# Patient Record
Sex: MALE | Race: WHITE | NOT HISPANIC OR LATINO | Employment: OTHER | URBAN - METROPOLITAN AREA
[De-identification: names, ages, dates, MRNs, and addresses within clinical notes are randomized per-mention and may not be internally consistent; named-entity substitution may affect disease eponyms.]

---

## 2018-01-04 ENCOUNTER — HOSPITAL ENCOUNTER (EMERGENCY)
Facility: HOSPITAL | Age: 53
Discharge: HOME | End: 2018-01-04
Attending: EMERGENCY MEDICINE | Admitting: EMERGENCY MEDICINE
Payer: MEDICARE

## 2018-01-04 VITALS
OXYGEN SATURATION: 98 % | DIASTOLIC BLOOD PRESSURE: 78 MMHG | TEMPERATURE: 97.5 F | RESPIRATION RATE: 20 BRPM | SYSTOLIC BLOOD PRESSURE: 127 MMHG | HEART RATE: 104 BPM

## 2018-01-04 DIAGNOSIS — F41.9 ANXIETY: Primary | ICD-10-CM

## 2018-01-04 PROCEDURE — 99283 EMERGENCY DEPT VISIT LOW MDM: CPT

## 2018-01-04 RX ORDER — DIVALPROEX SODIUM 500 MG/1
500 TABLET, EXTENDED RELEASE ORAL DAILY
COMMUNITY
End: 2021-04-01 | Stop reason: ALTCHOICE

## 2018-01-04 RX ORDER — DIVALPROEX SODIUM 500 MG/1
1000 TABLET, DELAYED RELEASE ORAL ONCE
Status: COMPLETED | OUTPATIENT
Start: 2018-01-04 | End: 2018-01-04

## 2018-01-04 RX ORDER — ALPRAZOLAM 0.5 MG/1
0.5 TABLET ORAL ONCE
Status: COMPLETED | OUTPATIENT
Start: 2018-01-04 | End: 2018-01-04

## 2018-01-04 RX ADMIN — ALPRAZOLAM 0.5 MG: 0.5 TABLET ORAL at 02:18

## 2018-01-04 RX ADMIN — DIVALPROEX SODIUM 1000 MG: 500 TABLET, DELAYED RELEASE ORAL at 02:18

## 2018-01-04 NOTE — ED ATTENDING ATTESTATION
Malaika Schroeder MD, saw and evaluated the patient  All available labs and X-rays were ordered by me or the resident and have been reviewed by myself  I discussed the patient with the resident / non-physician and agree with the resident's / non-physician practitioner's findings and plan as documented in the resident's / non-physician practicitioner's note, except where noted  At this point, I agree with the current assessment done in the ED  Chief Complaint   Patient presents with   [employer] Medical Problem     pt with missing dose of depakote after being taken into police custody for violating restraining order  This is a 50year old male in police custody who violated his restraining order and so is in police custody  He has his depakote at home but can't get it as he is in custody so is brought in for a depakote dosing  Has no acute complaints  Denies f/ch/n/v/cp/sob  Feels safe at home  Has no complaints  +smoking  No alcohol/drugs  Doesn't feel like he is going to have a seizure  Tetanus is up to date  PE:  Vitals:    01/04/18 0150   BP: 127/78   Pulse: 104   Resp: 20   Temp: 97 5 °F (36 4 °C)   TempSrc: Oral   SpO2: 98%   General: VS reviewed  Appears in NAD  awake, alert  Well-nourished, well-developed  Appears stated age  Speaking normally in full sentences  Head: Normocephalic, atraumatic, nontender  Eyes: EOM-I  No diplopia  No hyphema  No subconjunctival hemorrhages  Symmetrical lids  ENT: Atraumatic external nose and ears  MMM  No malocclusion  No stridor  Normal phonation  No drooling  Normal swallowing  Neck: No JVD  CV: No pallor noted  Peripheral pulses +2 throughout  No chest wall tenderness  Lungs:   No tachypnea  No respiratory distress  MSK:   FROM   Normal gait  Skin: Dry, intact  Neuro: Awake, alert, GCS15, CN II-XII grossly intact  Motor grossly intact    Psychiatric/Behavioral: Appropriate mood and affect   Exam: deferred  A:  - Medication dosing  P:  - Give depakote as he missed his home dose (500 in morning, 1000 at night of depakote)  - Return to police custody  He has no acute complaints so I don't think that an acute workup is needed  - 13 point ROS was performed and all are normal unless stated in the history above  - Nursing note reviewed  Vitals reviewed  - Orders placed by myself and/or advanced practitioner / resident     - Previous chart was not reviewed  - No language barrier    - History obtained from patient, police/EMS  - There are no limitations to the history obtained  - Critical care time: Not applicable for this patient  Final Diagnosis:  1  Anxiety      ED Course      Medications   divalproex sodium (DEPAKOTE) EC tablet 1,000 mg (1,000 mg Oral Given 1/4/18 0218)   ALPRAZolam Ardelle Fritter) tablet 0 5 mg (0 5 mg Oral Given 1/4/18 0218)     No orders to display     No orders of the defined types were placed in this encounter  Labs Reviewed - No data to display  Time reflects when diagnosis was documented in both MDM as applicable and the Disposition within this note     Time User Action Codes Description Comment    1/4/2018  1:54 AM Corinne Orts Add [F41 9] Anxiety       ED Disposition     ED Disposition Condition Comment    Discharge  Yenny Laundry discharge to home/self care  Condition at discharge: Good        Follow-up Information     Follow up With Specialties Details Why Contact Info Additional [address] Emergency Department Emergency Medicine  As needed [address] [facility], [address] [place] [address], [id number]        Discharge Medication List as of 1/4/2018  2:26 AM      CONTINUE these medications which have NOT CHANGED    Details   divalproex sodium (DEPAKOTE ER) 500 mg 24 hr tablet Take 500 mg by mouth daily Take 500 mg in AM and 1000 mg before bed , Historical Med           No discharge procedures on file    Prior to Admission Medications   Prescriptions Last Dose Informant Patient Reported? Taking?   divalproex sodium (DEPAKOTE ER) 500 mg 24 hr tablet 1/4/2018 at Unknown time  Yes Yes   Sig: Take 500 mg by mouth daily Take 500 mg in AM and 1000 mg before bed  Facility-Administered Medications: None       Portions of the record may have been created with voice recognition software  Occasional wrong word or "sound a like" substitutions may have occurred due to the inherent limitations of voice recognition software  Read the chart carefully and recognize, using context, where substitutions have occurred      Electronically signed by:  Shakila Mahoney

## 2018-01-04 NOTE — DISCHARGE INSTRUCTIONS
Epilepsy   WHAT YOU NEED TO KNOW:   Epilepsy is a brain disorder that causes seizures  It is also called a seizure disorder  A seizure means an abnormal area in your brain sometimes sends bursts of electrical activity  A seizure may start in one part of your brain, or both sides may be affected  Depending on the type of seizure, you may have movements you cannot control, lose consciousness, or stare straight ahead  You may be confused or tired after the seizure  A seizure may last a few seconds or longer than 5 minutes  A birth defect, tumor, stroke, dementia, injury, or infection may cause epilepsy  The cause of your epilepsy may not be known  If your seizures are not controlled, epilepsy may become life-threatening  DISCHARGE INSTRUCTIONS:   Call 911 for any of the following:   · Your seizure lasts longer than 5 minutes  · You have trouble breathing after a seizure  · You have diabetes or are pregnant and have a seizure  · You have a seizure in water, such as a swimming pool or bathtub  Return to the emergency department if:   · You have a second seizure within 24 hours of the first      · You are injured during a seizure  Contact your healthcare provider if:   · You feel you are not able to cope with your condition  · Your seizures start to happen more often  · You are confused longer than usual after a seizure  · You are planning to get pregnant or are currently pregnant  · You have questions or concerns about your condition or care  Medicines:   · Antiseizure medicine  may control or prevent another seizure  Do not stop taking this medicine  Another person may need to give you rescue medicine to stop a seizure at home  Ask your healthcare provider for more information about rescue medicine  · Take your medicine as directed  Contact your healthcare provider if you think your medicine is not helping or if you have side effects  Tell him of her if you are allergic to any medicine  Keep a list of the medicines, vitamins, and herbs you take  Include the amounts, and when and why you take them  Bring the list or the pill bottles to follow-up visits  Carry your medicine list with you in case of an emergency  Follow up with your neurologist as directed: You may need tests to check the level of antiseizure medicine in your blood  Your neurologist may need to change or adjust your medicine  Write down your questions so you remember to ask them during your visits  What you can do to prevent a seizure: You may not be able to prevent every seizure  The following can help you manage triggers that may make a seizure start:  · Take your medicine every day at the same time  This will also help prevent medicine side effects  Set an alarm to help remind you to take your medicine every day  · Manage stress  Stress can be a trigger for epilepsy  Exercise can help you reduce stress  Talk to your healthcare provider about exercise that is safe for you  Illness can be a form of stress  Eat a variety of healthy foods and drink plenty of liquids during an illness  Talk to your healthcare provider about other ways to manage stress  · Set a regular sleep schedule  A lack of sleep can trigger a seizure  Try to go to sleep and wake up at the same time every day  Keep your bedroom quiet and dark  Talk to your healthcare provider if you are having trouble sleeping  · Limit or do not drink alcohol as directed  Alcohol can trigger a seizure, especially if you drink a large amount at one time  A drink of alcohol is 12 ounces of beer, 1½ ounces of liquor, or 5 ounces of wine  Talk to your healthcare provider about a safe amount of alcohol for you  Your provider may recommend that you do not drink any alcohol  Tell him or her if you need help to quit drinking  What you can do to manage epilepsy:   · Keep a seizure diary  This can help you find your triggers and avoid them   Write down the dates of your seizures, where you were, and what you were doing  Include how you felt before and after  Possible triggers include illness, lack of sleep, hormonal changes, alcohol, drugs, lights, or stress  · Record any auras you have before a seizure  An aura is a sign that you are about to have a seizure  Auras happen before certain types of seizures that are in only 1 part of the brain  The aura may happen seconds before a seizure, or up to an hour before  You may feel, see, hear, or smell something  Examples include part of your body becoming hot  You may see a flash of light or hear something  You may have anxiety or déjà vu  If you have an aura, include it in your seizure diary  · Create a care plan  Tell family, friends, and coworkers about your epilepsy  Give them instructions that tell them how they can keep you safe if you have a seizure  · Find support  You may be referred to a psychologist or   Ask your healthcare provider about support groups for people with epilepsy  · Ask what safety precautions you should take  Talk with your healthcare provider about driving  You may not be able to drive until you are seizure-free for a period of time  You will need to check the law where you live  Also talk to your healthcare provider about swimming and bathing  You may drown or develop life-threatening heart or lung damage if you have a seizure in water  · Carry medical alert identification  Wear medical alert jewelry or carry a card that says you have epilepsy  Ask your healthcare provider where to get these items  How others can keep you safe during a seizure:  Give the following instructions to family, friends, and coworkers:  · Do not panic  · Do not hold me down or put anything in my mouth  · Gently guide me to the floor or a soft surface  · Place me on my side to help prevent me from swallowing saliva or vomit  · Protect me from injury   Remove sharp or hard objects from the area surrounding me, or cushion my head  · Loosen the clothing around my head and neck  · Time how long my seizure lasts  Call 911 if my seizure lasts longer than 5 minutes or if I have a second seizure  · Stay with me until my seizure ends  Let me rest until I am fully awake  · Perform CPR if I stop breathing or you cannot feel my pulse  · Do not give me anything to eat or drink until I am fully awake  © 2017 [facility] Information is for End User's use only and may not be sold, redistributed or otherwise used for commercial purposes  All illustrations and images included in CareNotes® are the copyrighted property of A D A M [employer] Inc  or Alec Parekh  The above information is an  only  It is not intended as medical advice for individual conditions or treatments  Talk to your doctor, nurse or pharmacist before following any medical regimen to see if it is safe and effective for you

## 2018-01-04 NOTE — ED PROVIDER NOTES
History  Chief Complaint   Patient presents with    Medical Problem     pt with missing dose of depakote after being taken into police custody for violating restraining order  53-year-old with history of epilepsy presents for evaluation in please custody after reportedly missing his home dose of his Depakote  Reports that he takes 500 mg in the morning and 1000 mg at night as well as 0 5 mg of Xanax 3 times a day  He currently has no complaints other than feeling a little anxious  Police report that they brought patient into ER for evaluation as per their policy  Patient was placed in custody this evening after violating a restraining order against him  Prior to Admission Medications   Prescriptions Last Dose Informant Patient Reported? Taking?   divalproex sodium (DEPAKOTE ER) 500 mg 24 hr tablet 1/4/2018 at Unknown time  Yes Yes   Sig: Take 500 mg by mouth daily Take 500 mg in AM and 1000 mg before bed  Facility-Administered Medications: None       Past Medical History:   Diagnosis Date    Seizures [facility])        Past Surgical History:   Procedure Laterality Date    BACK SURGERY         History reviewed  No pertinent family history  I have reviewed and agree with the history as documented  Social History   Substance Use Topics    Smoking status: Current Some Day Smoker    Smokeless tobacco: Never Used    Alcohol use Yes        Review of Systems   Constitutional: Negative for chills and fever  HENT: Negative for congestion and sore throat  Eyes: Negative for pain and redness  Respiratory: Negative for shortness of breath and wheezing  Cardiovascular: Negative for chest pain and palpitations  Gastrointestinal: Negative for abdominal pain, diarrhea and vomiting  Endocrine: Negative for polydipsia and polyphagia  Genitourinary: Negative for dysuria and flank pain  Musculoskeletal: Negative for arthralgias and back pain  Skin: Negative for rash and wound  Neurological: Negative for seizures and headaches  Psychiatric/Behavioral: Negative for agitation and behavioral problems  The patient is nervous/anxious  All other systems reviewed and are negative  Physical Exam  ED Triage Vitals [01/04/18 0150]   Temperature Pulse Respirations Blood Pressure SpO2   97 5 °F (36 4 °C) 104 20 127/78 98 %      Temp Source Heart Rate Source Patient Position - Orthostatic VS BP Location FiO2 (%)   Oral Monitor Sitting Left arm --      Pain Score       9           Orthostatic Vital Signs  Vitals:    01/04/18 0150   BP: 127/78   Pulse: 104   Patient Position - Orthostatic VS: Sitting       Physical Exam   Constitutional: He is oriented to person, place, and time  He appears well-developed and well-nourished  No distress  HENT:   Head: Normocephalic and atraumatic  Right Ear: External ear normal    Left Ear: External ear normal    Mouth/Throat: Oropharynx is clear and moist    Eyes: Conjunctivae and EOM are normal  Pupils are equal, round, and reactive to light  Neck: Normal range of motion  Neck supple  No thyromegaly present  Cardiovascular: Normal rate, regular rhythm and normal heart sounds  No murmur heard  Pulmonary/Chest: Breath sounds normal  No respiratory distress  He has no wheezes  Abdominal: Soft  He exhibits no distension  There is no tenderness  There is no rebound and no guarding  Musculoskeletal: Normal range of motion  He exhibits no deformity  Neurological: He is alert and oriented to person, place, and time  No cranial nerve deficit  Skin: Skin is warm  He is not diaphoretic  No erythema  Psychiatric: He has a normal mood and affect  His behavior is normal    Nursing note and vitals reviewed        ED Medications  Medications   divalproex sodium (DEPAKOTE) EC tablet 1,000 mg (1,000 mg Oral Given 1/4/18 0218)   ALPRAZolam Dorthey Halim) tablet 0 5 mg (0 5 mg Oral Given 1/4/18 0218)       Diagnostic Studies  Results Reviewed     None No orders to display         Procedures  Procedures      Phone Consults  ED Phone Contact    ED Course  ED Course                                MDM  Number of Diagnoses or Management Options  Diagnosis management comments: Impression:  Evaluation for missed dose of Depakote and Xanax, currently only complaining of anxiety  Plan:  Treat with home dose of his medications  CritCare Time    Disposition  Final diagnoses:   Anxiety     Time reflects when diagnosis was documented in both MDM as applicable and the Disposition within this note     Time User Action Codes Description Comment    1/4/2018  1:54 AM Kortney Desi Add [F41 9] Anxiety       ED Disposition     ED Disposition Condition Comment    Discharge  Virgen Subramanian discharge to home/self care  Condition at discharge: Good        Follow-up Information     Follow up With Specialties Details Why Contact Info Additional [address] Emergency Department Emergency Medicine  As needed [address] [facility], [address] [place] [address], [facility]        Discharge Medication List as of 1/4/2018  2:26 AM      CONTINUE these medications which have NOT CHANGED    Details   divalproex sodium (DEPAKOTE ER) 500 mg 24 hr tablet Take 500 mg by mouth daily Take 500 mg in AM and 1000 mg before bed , Historical Med           No discharge procedures on file  ED Provider  Attending physically available and evaluated Virgen Subramanian  JESSIE managed the patient along with the ED Attending      Electronically Signed by         Cheyenne Ibarra MD  Resident  01/04/18 5772

## 2018-08-21 ENCOUNTER — APPOINTMENT (OUTPATIENT)
Dept: LAB | Facility: CLINIC | Age: 53
End: 2018-08-21
Payer: COMMERCIAL

## 2018-08-21 ENCOUNTER — OFFICE VISIT (OUTPATIENT)
Dept: PLASTIC SURGERY | Facility: CLINIC | Age: 53
End: 2018-08-21
Payer: COMMERCIAL

## 2018-08-21 ENCOUNTER — TRANSCRIBE ORDERS (OUTPATIENT)
Dept: LAB | Facility: CLINIC | Age: 53
End: 2018-08-21

## 2018-08-21 VITALS — HEIGHT: 72 IN | BODY MASS INDEX: 29.66 KG/M2 | WEIGHT: 219 LBS

## 2018-08-21 DIAGNOSIS — Z12.5 SPECIAL SCREENING FOR MALIGNANT NEOPLASM OF PROSTATE: ICD-10-CM

## 2018-08-21 DIAGNOSIS — I10 ESSENTIAL HYPERTENSION, MALIGNANT: ICD-10-CM

## 2018-08-21 DIAGNOSIS — S02.2XXA NASAL SEPTUM FRACTURE, CLOSED, INITIAL ENCOUNTER: ICD-10-CM

## 2018-08-21 DIAGNOSIS — S02.2XXG OPEN FRACTURE OF NASAL BONE WITH DELAYED HEALING, SUBSEQUENT ENCOUNTER: ICD-10-CM

## 2018-08-21 DIAGNOSIS — I10 ESSENTIAL HYPERTENSION, MALIGNANT: Primary | ICD-10-CM

## 2018-08-21 DIAGNOSIS — S02.40FA ZYGOMATIC FRACTURE, LEFT SIDE, INITIAL ENCOUNTER FOR CLOSED FRACTURE (HCC): Primary | ICD-10-CM

## 2018-08-21 DIAGNOSIS — S02.2XXA CLOSED FRACTURE OF NASAL BONE, INITIAL ENCOUNTER: ICD-10-CM

## 2018-08-21 DIAGNOSIS — Q67.4: ICD-10-CM

## 2018-08-21 DIAGNOSIS — R06.89 BREATHING DIFFICULTY: ICD-10-CM

## 2018-08-21 PROBLEM — S02.2XXB OPEN FRACTURE OF NASAL BONES: Status: ACTIVE | Noted: 2018-08-21

## 2018-08-21 LAB
ALBUMIN SERPL BCP-MCNC: 3.6 G/DL (ref 3.5–5)
ALP SERPL-CCNC: 84 U/L (ref 46–116)
ALT SERPL W P-5'-P-CCNC: 32 U/L (ref 12–78)
ANION GAP SERPL CALCULATED.3IONS-SCNC: 10 MMOL/L (ref 4–13)
AST SERPL W P-5'-P-CCNC: 15 U/L (ref 5–45)
BASOPHILS # BLD AUTO: 0.06 THOUSANDS/ΜL (ref 0–0.1)
BASOPHILS NFR BLD AUTO: 1 % (ref 0–1)
BILIRUB SERPL-MCNC: 0.2 MG/DL (ref 0.2–1)
BUN SERPL-MCNC: 22 MG/DL (ref 5–25)
CALCIUM SERPL-MCNC: 8.7 MG/DL (ref 8.3–10.1)
CHLORIDE SERPL-SCNC: 105 MMOL/L (ref 100–108)
CO2 SERPL-SCNC: 26 MMOL/L (ref 21–32)
CREAT SERPL-MCNC: 1.01 MG/DL (ref 0.6–1.3)
EOSINOPHIL # BLD AUTO: 0.13 THOUSAND/ΜL (ref 0–0.61)
EOSINOPHIL NFR BLD AUTO: 1 % (ref 0–6)
ERYTHROCYTE [DISTWIDTH] IN BLOOD BY AUTOMATED COUNT: 14.2 % (ref 11.6–15.1)
GFR SERPL CREATININE-BSD FRML MDRD: 86 ML/MIN/1.73SQ M
GLUCOSE SERPL-MCNC: 85 MG/DL (ref 65–140)
HCT VFR BLD AUTO: 40.8 % (ref 36.5–49.3)
HGB BLD-MCNC: 13.3 G/DL (ref 12–17)
IMM GRANULOCYTES # BLD AUTO: 0.02 THOUSAND/UL (ref 0–0.2)
IMM GRANULOCYTES NFR BLD AUTO: 0 % (ref 0–2)
LYMPHOCYTES # BLD AUTO: 2.74 THOUSANDS/ΜL (ref 0.6–4.47)
LYMPHOCYTES NFR BLD AUTO: 28 % (ref 14–44)
MCH RBC QN AUTO: 30.2 PG (ref 26.8–34.3)
MCHC RBC AUTO-ENTMCNC: 32.6 G/DL (ref 31.4–37.4)
MCV RBC AUTO: 93 FL (ref 82–98)
MONOCYTES # BLD AUTO: 0.79 THOUSAND/ΜL (ref 0.17–1.22)
MONOCYTES NFR BLD AUTO: 8 % (ref 4–12)
NEUTROPHILS # BLD AUTO: 6.17 THOUSANDS/ΜL (ref 1.85–7.62)
NEUTS SEG NFR BLD AUTO: 62 % (ref 43–75)
NRBC BLD AUTO-RTO: 0 /100 WBCS
PLATELET # BLD AUTO: 336 THOUSANDS/UL (ref 149–390)
PMV BLD AUTO: 9.4 FL (ref 8.9–12.7)
POTASSIUM SERPL-SCNC: 3.9 MMOL/L (ref 3.5–5.3)
PROT SERPL-MCNC: 7 G/DL (ref 6.4–8.2)
PSA SERPL-MCNC: 2.6 NG/ML (ref 0–4)
RBC # BLD AUTO: 4.4 MILLION/UL (ref 3.88–5.62)
SODIUM SERPL-SCNC: 141 MMOL/L (ref 136–145)
WBC # BLD AUTO: 9.91 THOUSAND/UL (ref 4.31–10.16)

## 2018-08-21 PROCEDURE — 80053 COMPREHEN METABOLIC PANEL: CPT

## 2018-08-21 PROCEDURE — G0103 PSA SCREENING: HCPCS

## 2018-08-21 PROCEDURE — 85025 COMPLETE CBC W/AUTO DIFF WBC: CPT

## 2018-08-21 PROCEDURE — 99203 OFFICE O/P NEW LOW 30 MIN: CPT | Performed by: PLASTIC SURGERY

## 2018-08-21 PROCEDURE — 36415 COLL VENOUS BLD VENIPUNCTURE: CPT

## 2018-08-21 RX ORDER — ALPRAZOLAM 0.25 MG/1
0.25 TABLET ORAL 3 TIMES DAILY
COMMUNITY
End: 2019-09-04 | Stop reason: SDUPTHER

## 2018-08-21 RX ORDER — OXYCODONE AND ACETAMINOPHEN 10; 325 MG/1; MG/1
1 TABLET ORAL EVERY 4 HOURS PRN
Status: ON HOLD | COMMUNITY
End: 2018-08-24

## 2018-08-21 RX ORDER — DIVALPROEX SODIUM 500 MG/1
1000 TABLET, DELAYED RELEASE ORAL
Status: ON HOLD | COMMUNITY
End: 2018-08-23

## 2018-08-21 RX ORDER — CYCLOBENZAPRINE HCL 10 MG
10 TABLET ORAL 3 TIMES DAILY PRN
COMMUNITY
End: 2021-04-01 | Stop reason: ALTCHOICE

## 2018-08-21 NOTE — PROGRESS NOTES
8/21/2018    Plastic and Reconstructive Surgery Consultation    Reason for Consultation: facial trauma    HPI:   Tanna Martinez is a 48 y o  male  presenting with a history of recent trauma to the face  Patient explains that he has known seizure disorder for many years and recently had a seizure while walking and sustained fall hitting his left side of the face with sidewalk  Patient was evaluated at outside hospital that required hospitalization from 7/22/2018 to 7/24/2018 and underwent full trauma work up in which he was found to have some facial injuries  He was evaluated by a plastic surgeon as outpatient but due to insurance it was referred to us for further evaluation  Patient complaints of pain on his left side of face and worsens with biting and motion in general  Swelling has improved  He has baseline left sided peripheral vision decrease  He denies double or blurry vision  Past Medical History:  Past Medical History:   Diagnosis Date    Seizures ([facility] [address] )        Past Surgical History:  Past Surgical History:   Procedure Laterality Date    BACK SURGERY         Medications:    Current Outpatient Prescriptions:     ALPRAZolam (XANAX) 0 25 mg tablet, Take 0 25 mg by mouth Three times a day, Disp: , Rfl:     divalproex sodium (DEPAKOTE ER) 500 mg 24 hr tablet, Take 500 mg by mouth daily Take 500 mg in AM and 1000 mg before bed , Disp: , Rfl:       Allergies: Allergies   Allergen Reactions    Bee Venom        Family History:  family history is not on file  Social History:  Social History     Social History    Marital status:      Spouse name: N/A    Number of children: N/A    Years of education: N/A     Occupational History    Not on file       Social History Main Topics    Smoking status: Current Some Day Smoker    Smokeless tobacco: Never Used    Alcohol use Yes    Drug use: No    Sexual activity: Not on file     Other Topics Concern    Not on file     Social History Narrative    No narrative on file       Review of Systems:   A comprehensive 12-point review of systems is negative except those things noted in HPI above  No F/C  No CP/SOB  Negative for problems with anesthesia, surgery, bleeding, hypercoagulable state or wound healing problems  Physical Examination  Vitals:  Ht 6' (1 829 m)   Wt 99 3 kg (219 lb)   BMI 29 70 kg/m²   General: NAD, well appearing, Ox3  HEENT:  - Appearance:   Edema: none   Ecchymosis: none    Other:  Left malar flatten  - Lacerations/wounds: none  - Enophthalmos: none  - Vertical dystopia: none  - Scars: none  - CN 2-12: grossly intact, including PERRL and EOMI   - V1-V3: grossly intact  - VII: grossly intact bilaterally  - Facial instability/stepoffs: yes, orbital rim step off, tender to palpation at multiple sutures  Nasal bone incongruence and step offs  - Tenderness: yes  - Intraoral: Class I occlusion, no injuries  - Intranasal: moderate nasal obstruction with significant right side septal deviation/ no septal hematoma; Rabun maneuver positive  - LAD: none  - Neck: non-tender along cervical spine  Chest: RRR, unlabored breathing on room air  Abdomen: Soft/NT/ND, no organomegaly  Extremities: Neurovascularly intact,       Facial CT  - mildly displaced fracture lateral wall left orbit   - minimally displaced fracture left orbital floor  - minimally displaced fracture left zygomatic arch   - minimally displaced fracture anterior wall left maxillary   - nondisplaced fracture posterolateral inferior wall left maxillary sinus   - mildly displaced bilateral nasal fracture        Assessment & Plan  Aliya Ulloa is a 48 y o  male  s/p multiple facial bone fractures with delayed presentation  Patient still very symptomatic and step offs along the rim   Philip Trejo MD, have discussed surgery with the patient and recommended to attempt open reduction and internal fixation of left zygoma tripod complex fracture, left orbital rim, possible orbital floor fracture and closed reduction of nasal bones and septums  I explained that this will require multiple facial incisions  through transoral and transfacial approaches as needed for exposure of fracture segments  I discussed the incisions, placement of hardware, hospitalization, recovery time, and postoperative restrictions  I discussed risks including bleeding, infection, fluid collection, scarring, wound breakdown, need for further surgery, malocclusion, retrobulbar hematoma, neurovascular structure injuries,  non-union, need for hardware removal, pain, numbness, and facial paralysis  We also discussed that given his delayed presentation, chances of anatomic reduction is decrease but still possible  Otherwise he would require formal osteotomies to recreate fragments prior to reduction  The patient understands all of these things and wishes to proceed and signed the consent form  Patient will be scheduled for surgery as soon as possible  All of the patient's questions were answered to their satisfaction  A total of 45 minutes of face-to-face time was spent in this encounter, of which >50% was spent in counseling        [facility] [address] [facility]   [address]   [address]   [address] Rickie Davidson   Office: [phone]  Yesy: [phone]

## 2018-08-21 NOTE — PRE-PROCEDURE INSTRUCTIONS
Pre-Surgery Instructions:   Medication Instructions    ALPRAZolam (XANAX) 0 25 mg tablet Instructed patient per Anesthesia Guidelines   cyclobenzaprine (FLEXERIL) 10 mg tablet Instructed patient per Anesthesia Guidelines   divalproex sodium (DEPAKOTE ER) 500 mg 24 hr tablet Instructed patient per Anesthesia Guidelines   divalproex sodium (DEPAKOTE) 500 mg EC tablet Instructed patient per Anesthesia Guidelines   oxyCODONE-acetaminophen (PERCOCET)  mg per tablet Instructed patient per Anesthesia Guidelines  Pre-op and bathing instructions given  Patient has hibiclens

## 2018-08-22 ENCOUNTER — ANESTHESIA EVENT (OUTPATIENT)
Dept: PERIOP | Facility: HOSPITAL | Age: 53
End: 2018-08-22
Payer: COMMERCIAL

## 2018-08-23 ENCOUNTER — HOSPITAL ENCOUNTER (OUTPATIENT)
Facility: HOSPITAL | Age: 53
Setting detail: OUTPATIENT SURGERY
Discharge: HOME | End: 2018-08-24
Attending: PLASTIC SURGERY | Admitting: PLASTIC SURGERY
Payer: COMMERCIAL

## 2018-08-23 ENCOUNTER — ANESTHESIA (OUTPATIENT)
Dept: PERIOP | Facility: HOSPITAL | Age: 53
End: 2018-08-23
Payer: COMMERCIAL

## 2018-08-23 DIAGNOSIS — S02.40FA ZYGOMATIC FRACTURE, LEFT SIDE, INITIAL ENCOUNTER FOR CLOSED FRACTURE (HCC): Primary | ICD-10-CM

## 2018-08-23 LAB
PLATELET # BLD AUTO: 252 THOUSANDS/UL (ref 149–390)
PMV BLD AUTO: 10.5 FL (ref 8.9–12.7)

## 2018-08-23 PROCEDURE — C1713 ANCHOR/SCREW BN/BN,TIS/BN: HCPCS | Performed by: PLASTIC SURGERY

## 2018-08-23 PROCEDURE — 85049 AUTOMATED PLATELET COUNT: CPT | Performed by: PLASTIC SURGERY

## 2018-08-23 PROCEDURE — 21320 CLSD TX NSL FX W/MNPJ&STABLJ: CPT | Performed by: PLASTIC SURGERY

## 2018-08-23 PROCEDURE — 21365 OPN TX COMPLX MALAR FX: CPT | Performed by: PLASTIC SURGERY

## 2018-08-23 DEVICE — TI MATRIXMIDFACE SCREW SELF-DRILLING 8MM
Type: IMPLANTABLE DEVICE | Site: FACE | Status: FUNCTIONAL
Brand: MATRIXMIDFACE

## 2018-08-23 DEVICE — TI MATRIXMIDFACE SCREW SELF-DRILLING 5MM
Type: IMPLANTABLE DEVICE | Site: FACE | Status: FUNCTIONAL
Brand: MATRIXMIDFACE

## 2018-08-23 DEVICE — TI MATRIXMIDFACE OBLIQUE L-PL 3X4 HOLES-LEFT/0.5MM THICK
Type: IMPLANTABLE DEVICE | Site: FACE | Status: FUNCTIONAL
Brand: MATRIXMIDFACE

## 2018-08-23 DEVICE — TI MATRIXMIDFACE SCREW SELF-DRILLING 6MM
Type: IMPLANTABLE DEVICE | Site: FACE | Status: FUNCTIONAL
Brand: MATRIXMIDFACE

## 2018-08-23 DEVICE — TI MATRIXMIDFACE ORBITAL RIM PLATE/12 HOLES/0.5MM THICK
Type: IMPLANTABLE DEVICE | Site: FACE | Status: FUNCTIONAL
Brand: MATRIXMIDFACE

## 2018-08-23 RX ORDER — BACITRACIN 500 [USP'U]/G
OINTMENT OPHTHALMIC AS NEEDED
Status: DISCONTINUED | OUTPATIENT
Start: 2018-08-23 | End: 2018-08-23 | Stop reason: HOSPADM

## 2018-08-23 RX ORDER — DEXMEDETOMIDINE HYDROCHLORIDE 100 UG/ML
INJECTION, SOLUTION INTRAVENOUS AS NEEDED
Status: DISCONTINUED | OUTPATIENT
Start: 2018-08-23 | End: 2018-08-23 | Stop reason: SURG

## 2018-08-23 RX ORDER — SCOLOPAMINE TRANSDERMAL SYSTEM 1 MG/1
PATCH, EXTENDED RELEASE TRANSDERMAL
Status: COMPLETED
Start: 2018-08-23 | End: 2018-08-23

## 2018-08-23 RX ORDER — GLYCOPYRROLATE 0.2 MG/ML
INJECTION INTRAMUSCULAR; INTRAVENOUS AS NEEDED
Status: DISCONTINUED | OUTPATIENT
Start: 2018-08-23 | End: 2018-08-23 | Stop reason: SURG

## 2018-08-23 RX ORDER — SODIUM CHLORIDE, SODIUM LACTATE, POTASSIUM CHLORIDE, CALCIUM CHLORIDE 600; 310; 30; 20 MG/100ML; MG/100ML; MG/100ML; MG/100ML
125 INJECTION, SOLUTION INTRAVENOUS CONTINUOUS
Status: DISCONTINUED | OUTPATIENT
Start: 2018-08-23 | End: 2018-08-24 | Stop reason: HOSPADM

## 2018-08-23 RX ORDER — PREGABALIN 75 MG/1
150 CAPSULE ORAL ONCE
Status: DISCONTINUED | OUTPATIENT
Start: 2018-08-24 | End: 2018-08-23

## 2018-08-23 RX ORDER — ONDANSETRON 2 MG/ML
INJECTION INTRAMUSCULAR; INTRAVENOUS AS NEEDED
Status: DISCONTINUED | OUTPATIENT
Start: 2018-08-23 | End: 2018-08-23 | Stop reason: SURG

## 2018-08-23 RX ORDER — PREGABALIN 75 MG/1
150 CAPSULE ORAL ONCE
Status: COMPLETED | OUTPATIENT
Start: 2018-08-23 | End: 2018-08-23

## 2018-08-23 RX ORDER — DIVALPROEX SODIUM 500 MG/1
1000 TABLET, DELAYED RELEASE ORAL
Status: DISCONTINUED | OUTPATIENT
Start: 2018-08-23 | End: 2018-08-24 | Stop reason: HOSPADM

## 2018-08-23 RX ORDER — TOBRAMYCIN 3 MG/ML
1 SOLUTION/ DROPS OPHTHALMIC
Status: DISCONTINUED | OUTPATIENT
Start: 2018-08-23 | End: 2018-08-24 | Stop reason: HOSPADM

## 2018-08-23 RX ORDER — ONDANSETRON 2 MG/ML
4 INJECTION INTRAMUSCULAR; INTRAVENOUS ONCE AS NEEDED
Status: DISCONTINUED | OUTPATIENT
Start: 2018-08-23 | End: 2018-08-23 | Stop reason: HOSPADM

## 2018-08-23 RX ORDER — CYCLOBENZAPRINE HCL 10 MG
10 TABLET ORAL 3 TIMES DAILY PRN
Status: DISCONTINUED | OUTPATIENT
Start: 2018-08-23 | End: 2018-08-24 | Stop reason: HOSPADM

## 2018-08-23 RX ORDER — FENTANYL CITRATE 50 UG/ML
INJECTION, SOLUTION INTRAMUSCULAR; INTRAVENOUS AS NEEDED
Status: DISCONTINUED | OUTPATIENT
Start: 2018-08-23 | End: 2018-08-23 | Stop reason: SURG

## 2018-08-23 RX ORDER — LIDOCAINE HYDROCHLORIDE 10 MG/ML
INJECTION, SOLUTION INFILTRATION; PERINEURAL AS NEEDED
Status: DISCONTINUED | OUTPATIENT
Start: 2018-08-23 | End: 2018-08-23 | Stop reason: SURG

## 2018-08-23 RX ORDER — MIDAZOLAM HYDROCHLORIDE 1 MG/ML
INJECTION INTRAMUSCULAR; INTRAVENOUS AS NEEDED
Status: DISCONTINUED | OUTPATIENT
Start: 2018-08-23 | End: 2018-08-23 | Stop reason: SURG

## 2018-08-23 RX ORDER — OXYCODONE HYDROCHLORIDE AND ACETAMINOPHEN 5; 325 MG/1; MG/1
1 TABLET ORAL EVERY 4 HOURS PRN
Status: DISCONTINUED | OUTPATIENT
Start: 2018-08-23 | End: 2018-08-24 | Stop reason: HOSPADM

## 2018-08-23 RX ORDER — OXYMETAZOLINE HYDROCHLORIDE 0.05 G/100ML
SPRAY NASAL AS NEEDED
Status: DISCONTINUED | OUTPATIENT
Start: 2018-08-23 | End: 2018-08-23 | Stop reason: HOSPADM

## 2018-08-23 RX ORDER — CHLORHEXIDINE GLUCONATE 0.12 MG/ML
15 RINSE ORAL EVERY 12 HOURS SCHEDULED
Status: DISCONTINUED | OUTPATIENT
Start: 2018-08-23 | End: 2018-08-23 | Stop reason: HOSPADM

## 2018-08-23 RX ORDER — CHLORHEXIDINE GLUCONATE 0.12 MG/ML
RINSE ORAL AS NEEDED
Status: DISCONTINUED | OUTPATIENT
Start: 2018-08-23 | End: 2018-08-23 | Stop reason: HOSPADM

## 2018-08-23 RX ORDER — FENTANYL CITRATE/PF 50 MCG/ML
50 SYRINGE (ML) INJECTION
Status: COMPLETED | OUTPATIENT
Start: 2018-08-23 | End: 2018-08-23

## 2018-08-23 RX ORDER — BALANCED SALT SOLUTION 6.4; .75; .48; .3; 3.9; 1.7 MG/ML; MG/ML; MG/ML; MG/ML; MG/ML; MG/ML
SOLUTION OPHTHALMIC AS NEEDED
Status: DISCONTINUED | OUTPATIENT
Start: 2018-08-23 | End: 2018-08-23 | Stop reason: HOSPADM

## 2018-08-23 RX ORDER — DEXAMETHASONE SODIUM PHOSPHATE 4 MG/ML
INJECTION, SOLUTION INTRA-ARTICULAR; INTRALESIONAL; INTRAMUSCULAR; INTRAVENOUS; SOFT TISSUE AS NEEDED
Status: DISCONTINUED | OUTPATIENT
Start: 2018-08-23 | End: 2018-08-23 | Stop reason: SURG

## 2018-08-23 RX ORDER — HEPARIN SODIUM 5000 [USP'U]/ML
5000 INJECTION, SOLUTION INTRAVENOUS; SUBCUTANEOUS EVERY 8 HOURS SCHEDULED
Status: DISCONTINUED | OUTPATIENT
Start: 2018-08-23 | End: 2018-08-24 | Stop reason: HOSPADM

## 2018-08-23 RX ORDER — DIVALPROEX SODIUM 500 MG/1
500 TABLET, EXTENDED RELEASE ORAL DAILY
Status: DISCONTINUED | OUTPATIENT
Start: 2018-08-24 | End: 2018-08-24 | Stop reason: HOSPADM

## 2018-08-23 RX ORDER — EPHEDRINE SULFATE 50 MG/ML
INJECTION, SOLUTION INTRAVENOUS AS NEEDED
Status: DISCONTINUED | OUTPATIENT
Start: 2018-08-23 | End: 2018-08-23 | Stop reason: SURG

## 2018-08-23 RX ORDER — DIPHENHYDRAMINE HYDROCHLORIDE 50 MG/ML
12.5 INJECTION INTRAMUSCULAR; INTRAVENOUS ONCE AS NEEDED
Status: DISCONTINUED | OUTPATIENT
Start: 2018-08-23 | End: 2018-08-23 | Stop reason: HOSPADM

## 2018-08-23 RX ORDER — SCOLOPAMINE TRANSDERMAL SYSTEM 1 MG/1
PATCH, EXTENDED RELEASE TRANSDERMAL AS NEEDED
Status: DISCONTINUED | OUTPATIENT
Start: 2018-08-23 | End: 2018-08-23 | Stop reason: SURG

## 2018-08-23 RX ORDER — SODIUM CHLORIDE 9 MG/ML
100 INJECTION, SOLUTION INTRAVENOUS CONTINUOUS
Status: DISCONTINUED | OUTPATIENT
Start: 2018-08-23 | End: 2018-08-23

## 2018-08-23 RX ORDER — SODIUM CHLORIDE 9 MG/ML
INJECTION, SOLUTION INTRAVENOUS CONTINUOUS PRN
Status: DISCONTINUED | OUTPATIENT
Start: 2018-08-23 | End: 2018-08-23 | Stop reason: SURG

## 2018-08-23 RX ORDER — ALBUTEROL SULFATE 2.5 MG/3ML
2.5 SOLUTION RESPIRATORY (INHALATION) AS NEEDED
Status: DISCONTINUED | OUTPATIENT
Start: 2018-08-23 | End: 2018-08-23 | Stop reason: HOSPADM

## 2018-08-23 RX ORDER — CEFAZOLIN SODIUM 1 G/3ML
INJECTION, POWDER, FOR SOLUTION INTRAMUSCULAR; INTRAVENOUS AS NEEDED
Status: DISCONTINUED | OUTPATIENT
Start: 2018-08-23 | End: 2018-08-23 | Stop reason: SURG

## 2018-08-23 RX ORDER — METOCLOPRAMIDE HYDROCHLORIDE 5 MG/ML
10 INJECTION INTRAMUSCULAR; INTRAVENOUS ONCE AS NEEDED
Status: DISCONTINUED | OUTPATIENT
Start: 2018-08-23 | End: 2018-08-23 | Stop reason: HOSPADM

## 2018-08-23 RX ORDER — BUPIVACAINE HYDROCHLORIDE AND EPINEPHRINE 5; 5 MG/ML; UG/ML
INJECTION, SOLUTION PERINEURAL AS NEEDED
Status: DISCONTINUED | OUTPATIENT
Start: 2018-08-23 | End: 2018-08-23 | Stop reason: HOSPADM

## 2018-08-23 RX ORDER — AMOXICILLIN AND CLAVULANATE POTASSIUM 500; 125 MG/1; MG/1
1 TABLET, FILM COATED ORAL EVERY 8 HOURS SCHEDULED
Status: DISCONTINUED | OUTPATIENT
Start: 2018-08-23 | End: 2018-08-24 | Stop reason: HOSPADM

## 2018-08-23 RX ORDER — PROPOFOL 10 MG/ML
INJECTION, EMULSION INTRAVENOUS AS NEEDED
Status: DISCONTINUED | OUTPATIENT
Start: 2018-08-23 | End: 2018-08-23 | Stop reason: SURG

## 2018-08-23 RX ORDER — ALPRAZOLAM 0.25 MG/1
0.25 TABLET ORAL
Status: DISCONTINUED | OUTPATIENT
Start: 2018-08-23 | End: 2018-08-24 | Stop reason: HOSPADM

## 2018-08-23 RX ORDER — ROCURONIUM BROMIDE 10 MG/ML
INJECTION, SOLUTION INTRAVENOUS AS NEEDED
Status: DISCONTINUED | OUTPATIENT
Start: 2018-08-23 | End: 2018-08-23 | Stop reason: SURG

## 2018-08-23 RX ADMIN — ONDANSETRON 4 MG: 2 INJECTION INTRAMUSCULAR; INTRAVENOUS at 10:55

## 2018-08-23 RX ADMIN — SODIUM CHLORIDE, SODIUM LACTATE, POTASSIUM CHLORIDE, AND CALCIUM CHLORIDE 125 ML/HR: .6; .31; .03; .02 INJECTION, SOLUTION INTRAVENOUS at 12:28

## 2018-08-23 RX ADMIN — HYDROMORPHONE HYDROCHLORIDE 0.5 MG: 1 INJECTION, SOLUTION INTRAMUSCULAR; INTRAVENOUS; SUBCUTANEOUS at 10:11

## 2018-08-23 RX ADMIN — SCOPALAMINE 1 PATCH: 1 PATCH, EXTENDED RELEASE TRANSDERMAL at 07:35

## 2018-08-23 RX ADMIN — ROCURONIUM BROMIDE 10 MG: 10 INJECTION INTRAVENOUS at 09:33

## 2018-08-23 RX ADMIN — ROCURONIUM BROMIDE 50 MG: 10 INJECTION INTRAVENOUS at 08:20

## 2018-08-23 RX ADMIN — SODIUM CHLORIDE: 0.9 INJECTION, SOLUTION INTRAVENOUS at 11:01

## 2018-08-23 RX ADMIN — HYDROMORPHONE HYDROCHLORIDE 1 MG: 1 INJECTION, SOLUTION INTRAMUSCULAR; INTRAVENOUS; SUBCUTANEOUS at 16:19

## 2018-08-23 RX ADMIN — PREGABALIN 150 MG: 75 CAPSULE ORAL at 07:48

## 2018-08-23 RX ADMIN — TOBRAMYCIN 1 DROP: 3 SOLUTION OPHTHALMIC at 13:55

## 2018-08-23 RX ADMIN — AMOXICILLIN AND CLAVULANATE POTASSIUM 1 TABLET: 500; 125 TABLET, FILM COATED ORAL at 21:40

## 2018-08-23 RX ADMIN — DEXMEDETOMIDINE HYDROCHLORIDE 10 MCG: 100 INJECTION, SOLUTION INTRAVENOUS at 08:32

## 2018-08-23 RX ADMIN — DEXMEDETOMIDINE HYDROCHLORIDE 10 MCG: 100 INJECTION, SOLUTION INTRAVENOUS at 08:34

## 2018-08-23 RX ADMIN — EPHEDRINE SULFATE 5 MG: 50 INJECTION, SOLUTION INTRAMUSCULAR; INTRAVENOUS; SUBCUTANEOUS at 09:07

## 2018-08-23 RX ADMIN — FENTANYL CITRATE 100 MCG: 50 INJECTION INTRAMUSCULAR; INTRAVENOUS at 08:14

## 2018-08-23 RX ADMIN — ALPRAZOLAM 0.25 MG: 0.25 TABLET ORAL at 21:40

## 2018-08-23 RX ADMIN — NEOSTIGMINE METHYLSULFATE 1 MG: 1 INJECTION, SOLUTION INTRAMUSCULAR; INTRAVENOUS; SUBCUTANEOUS at 10:58

## 2018-08-23 RX ADMIN — TOBRAMYCIN 1 DROP: 3 SOLUTION OPHTHALMIC at 21:41

## 2018-08-23 RX ADMIN — OXYCODONE HYDROCHLORIDE AND ACETAMINOPHEN 1 TABLET: 5; 325 TABLET ORAL at 13:54

## 2018-08-23 RX ADMIN — DIVALPROEX SODIUM 1000 MG: 500 TABLET, DELAYED RELEASE ORAL at 21:40

## 2018-08-23 RX ADMIN — EPHEDRINE SULFATE 10 MG: 50 INJECTION, SOLUTION INTRAMUSCULAR; INTRAVENOUS; SUBCUTANEOUS at 08:47

## 2018-08-23 RX ADMIN — FENTANYL CITRATE 50 MCG: 50 INJECTION INTRAMUSCULAR; INTRAVENOUS at 11:44

## 2018-08-23 RX ADMIN — DEXAMETHASONE SODIUM PHOSPHATE 10 MG: 4 INJECTION, SOLUTION INTRAMUSCULAR; INTRAVENOUS at 08:32

## 2018-08-23 RX ADMIN — CEFAZOLIN SODIUM 2000 MG: 1 INJECTION, POWDER, FOR SOLUTION INTRAMUSCULAR; INTRAVENOUS at 08:29

## 2018-08-23 RX ADMIN — DEXMEDETOMIDINE HYDROCHLORIDE 10 MCG: 100 INJECTION, SOLUTION INTRAVENOUS at 08:30

## 2018-08-23 RX ADMIN — FENTANYL CITRATE 50 MCG: 50 INJECTION INTRAMUSCULAR; INTRAVENOUS at 11:50

## 2018-08-23 RX ADMIN — MIDAZOLAM HYDROCHLORIDE 2 MG: 1 INJECTION, SOLUTION INTRAMUSCULAR; INTRAVENOUS at 08:14

## 2018-08-23 RX ADMIN — LIDOCAINE HYDROCHLORIDE 50 MG: 10 INJECTION, SOLUTION INFILTRATION; PERINEURAL at 08:20

## 2018-08-23 RX ADMIN — HYDROMORPHONE HYDROCHLORIDE 1 MG: 1 INJECTION, SOLUTION INTRAMUSCULAR; INTRAVENOUS; SUBCUTANEOUS at 20:37

## 2018-08-23 RX ADMIN — SODIUM CHLORIDE: 0.9 INJECTION, SOLUTION INTRAVENOUS at 07:06

## 2018-08-23 RX ADMIN — EPHEDRINE SULFATE 5 MG: 50 INJECTION, SOLUTION INTRAMUSCULAR; INTRAVENOUS; SUBCUTANEOUS at 09:22

## 2018-08-23 RX ADMIN — FENTANYL CITRATE 50 MCG: 50 INJECTION INTRAMUSCULAR; INTRAVENOUS at 10:06

## 2018-08-23 RX ADMIN — ROCURONIUM BROMIDE 10 MG: 10 INJECTION INTRAVENOUS at 09:39

## 2018-08-23 RX ADMIN — EPHEDRINE SULFATE 5 MG: 50 INJECTION, SOLUTION INTRAMUSCULAR; INTRAVENOUS; SUBCUTANEOUS at 09:00

## 2018-08-23 RX ADMIN — DEXMEDETOMIDINE HYDROCHLORIDE 0.4 MCG/KG/HR: 100 INJECTION, SOLUTION INTRAVENOUS at 08:23

## 2018-08-23 RX ADMIN — SODIUM CHLORIDE, SODIUM LACTATE, POTASSIUM CHLORIDE, AND CALCIUM CHLORIDE 125 ML/HR: .6; .31; .03; .02 INJECTION, SOLUTION INTRAVENOUS at 20:38

## 2018-08-23 RX ADMIN — HYDROMORPHONE HYDROCHLORIDE 0.25 MG: 1 INJECTION, SOLUTION INTRAMUSCULAR; INTRAVENOUS; SUBCUTANEOUS at 10:55

## 2018-08-23 RX ADMIN — DEXMEDETOMIDINE HYDROCHLORIDE 10 MCG: 100 INJECTION, SOLUTION INTRAVENOUS at 08:38

## 2018-08-23 RX ADMIN — ROCURONIUM BROMIDE 10 MG: 10 INJECTION INTRAVENOUS at 09:36

## 2018-08-23 RX ADMIN — PROPOFOL 200 MG: 10 INJECTION, EMULSION INTRAVENOUS at 08:20

## 2018-08-23 RX ADMIN — DEXMEDETOMIDINE HYDROCHLORIDE 10 MCG: 100 INJECTION, SOLUTION INTRAVENOUS at 08:36

## 2018-08-23 RX ADMIN — AMOXICILLIN AND CLAVULANATE POTASSIUM 1 TABLET: 500; 125 TABLET, FILM COATED ORAL at 13:54

## 2018-08-23 RX ADMIN — HYDROMORPHONE HYDROCHLORIDE 0.25 MG: 1 INJECTION, SOLUTION INTRAMUSCULAR; INTRAVENOUS; SUBCUTANEOUS at 10:44

## 2018-08-23 RX ADMIN — TOBRAMYCIN 1 DROP: 3 SOLUTION OPHTHALMIC at 18:23

## 2018-08-23 RX ADMIN — FENTANYL CITRATE 50 MCG: 50 INJECTION INTRAMUSCULAR; INTRAVENOUS at 09:42

## 2018-08-23 RX ADMIN — OXYCODONE HYDROCHLORIDE AND ACETAMINOPHEN 1 TABLET: 5; 325 TABLET ORAL at 18:22

## 2018-08-23 RX ADMIN — GLYCOPYRROLATE 0.2 MG: 0.2 INJECTION, SOLUTION INTRAMUSCULAR; INTRAVENOUS at 10:58

## 2018-08-23 RX ADMIN — HEPARIN SODIUM 5000 UNITS: 5000 INJECTION, SOLUTION INTRAVENOUS; SUBCUTANEOUS at 21:41

## 2018-08-23 RX ADMIN — HEPARIN SODIUM 5000 UNITS: 5000 INJECTION, SOLUTION INTRAVENOUS; SUBCUTANEOUS at 13:54

## 2018-08-23 NOTE — ANESTHESIA PREPROCEDURE EVALUATION
Review of Systems/Medical History  Patient summary reviewed  Chart reviewed  History of anesthetic complications (difficult post op pain management, quick emergence) PONV    Cardiovascular  Hypertension (no meds) ,    Pulmonary  Smoker (1/2 ppd) cigarette smoker  , Asthma (no meds) ,        GI/Hepatic  Negative GI/hepatic ROS          Negative  ROS        Endo/Other  Negative endo/other ROS      GYN       Hematology  Negative hematology ROS      Musculoskeletal  Back pain , lumbar pain,        Neurology  Seizures (s/p seizure with fall -> facial fractures  1 mo ago  Grand mal type) ,     Psychology   Anxiety,   Chronic opioid dependence (on and off percocet for hx LBP, now fracture pain) Chronic pain,          Physical Exam    Airway    Mallampati score: I  TM Distance: >3 FB  Neck ROM: full     Dental   Comment: Bridge removed; denies loose,     Cardiovascular      Pulmonary      Other Findings       Lab Results   Component Value Date    WBC 9 91 08/21/2018    HGB 13 3 08/21/2018     08/21/2018     Lab Results   Component Value Date     08/21/2018    K 3 9 08/21/2018    BUN 22 08/21/2018    CREATININE 1 01 08/21/2018    GLUCOSE 85 08/21/2018     Anesthesia Plan  ASA Score- 2     Anesthesia Type- general with ASA Monitors  Additional Monitors:   Airway Plan: ETT  Comment: Pt to be admitted overnight per surgeon  Scopolamine patch, multimodal PONV ppx  PO lyrica preop, dexmedetomidine  No ketamine given seizure d/o  Plan Factors- Patient instructed to abstain from smoking on day of procedure  Patient smoked on day of surgery  Induction- intravenous  Postoperative Plan- Plan for postoperative opioid use  Planned trial extubation    Informed Consent- Anesthetic plan and risks discussed with patient

## 2018-08-23 NOTE — INTERIM OP NOTE
OPEN REDUCTION W/ INTERNAL FIXATION (ORIF) ZYGOMATIC FRACTURE, CLOSED REDUCTION NASAL FRACTURE  Postoperative Note  PATIENT NAME: Osmin Ray  : 1965  MRN: [id number]  AN OR ROOM 04    Surgery Date: 2018    Preop Diagnosis:  Open fracture of nasal bone with delayed healing, subsequent encounter [S02  2XXG]    Post-Op Diagnosis Codes:     * Open fracture of nasal bone with delayed healing, subsequent encounter [S02  2XXG]    Procedure(s) (LRB):  OPEN REDUCTION W/ INTERNAL FIXATION (ORIF) ZYGOMATIC FRACTURE (Left)  CLOSED REDUCTION NASAL FRACTURE (N/A)    Surgeon(s) and Role:     * Erroll Skiff, MD - Primary    Specimens:  * No specimens in log *    Estimated Blood Loss:   50 mL    Anesthesia Type:   General     Findings:    ZMC complex mildly displaced posteriorly   Nasal Bone depression bilaterally   Complications:   None    SIGNATURE: Lalit Seals   DATE: 2018   TIME: 11:26 AM

## 2018-08-23 NOTE — PROGRESS NOTES
Patient is sleeping soundly, when awakened and asked about pain he reports a pain level of 6/10  Patient was medicated twice with narcotic pain medication, both times resulting in a drop of patients oxygen saturation levels into the low 80's  As per Dr Yuri Hightower patient may be discharged to floor care at present time  Report called to Navdeep Torres RN

## 2018-08-23 NOTE — H&P
8/21/2018     Plastic and Reconstructive Surgery Consultation     Reason for Consultation: facial trauma     HPI:   Vijaya Boudreaux is a 48 y o  male  presenting with a history of recent trauma to the face  Patient explains that he has known seizure disorder for many years and recently had a seizure while walking and sustained fall hitting his left side of the face with sidewalk  Patient was evaluated at outside hospital that required hospitalization from 7/22/2018 to 7/24/2018 and underwent full trauma work up in which he was found to have some facial injuries       He was evaluated by a plastic surgeon as outpatient but due to insurance it was referred to us for further evaluation  Patient complaints of pain on his left side of face and worsens with biting and motion in general  Swelling has improved  He has baseline left sided peripheral vision decrease  He denies double or blurry vision          Past Medical History:  Medical History        Past Medical History:   Diagnosis Date    Seizures ([facility] [address] )             Past Surgical History:  Surgical History         Past Surgical History:   Procedure Laterality Date    BACK SURGERY                Medications:     Current Outpatient Prescriptions:     ALPRAZolam (XANAX) 0 25 mg tablet, Take 0 25 mg by mouth Three times a day, Disp: , Rfl:     divalproex sodium (DEPAKOTE ER) 500 mg 24 hr tablet, Take 500 mg by mouth daily Take 500 mg in AM and 1000 mg before bed , Disp: , Rfl:         Allergies:        Allergies   Allergen Reactions    Bee Venom           Family History:  family history is not on file      Social History:  Social History   Social History            Social History    Marital status:        Spouse name: N/A    Number of children: N/A    Years of education: N/A          Occupational History    Not on file            Social History Main Topics    Smoking status: Current Some Day Smoker    Smokeless tobacco: Never Used    Alcohol use Yes    Drug use: No    Sexual activity: Not on file           Other Topics Concern    Not on file          Social History Narrative    No narrative on file            Review of Systems:   A comprehensive 12-point review of systems is negative except those things noted in HPI above  No F/C  No CP/SOB  Negative for problems with anesthesia, surgery, bleeding, hypercoagulable state or wound healing problems         Physical Examination  Vitals:  Ht 6' (1 829 m)   Wt 99 3 kg (219 lb)   BMI 29 70 kg/m²   General: NAD, well appearing, Ox3  HEENT:  - Appearance:              Edema: none              Ecchymosis: none               Other:  Left malar flatten  - Lacerations/wounds: none  - Enophthalmos: none  - Vertical dystopia: none  - Scars: none  - CN 2-12: grossly intact, including PERRL and EOMI   - V1-V3: grossly intact  - VII: grossly intact bilaterally  - Facial instability/stepoffs: yes, orbital rim step off, tender to palpation at multiple sutures  Nasal bone incongruence and step offs  - Tenderness: yes  - Intraoral: Class I occlusion, no injuries  - Intranasal: moderate nasal obstruction with significant right side septal deviation/ no septal hematoma; [place] maneuver positive  - LAD: none  - Neck: non-tender along cervical spine  Chest: RRR, unlabored breathing on room air  Abdomen: Soft/NT/ND, no organomegaly  Extremities: Neurovascularly intact,         Facial CT  - mildly displaced fracture lateral wall left orbit   - minimally displaced fracture left orbital floor  - minimally displaced fracture left zygomatic arch   - minimally displaced fracture anterior wall left maxillary   - nondisplaced fracture posterolateral inferior wall left maxillary sinus   - mildly displaced bilateral nasal fracture         Assessment & Plan  Shade Chin is a 48 y o  male  s/p multiple facial bone fractures with delayed presentation  Patient still very symptomatic and step offs along the rim   Emile Palacios MD, have discussed surgery with the patient and recommended to attempt open reduction and internal fixation of left zygoma tripod complex fracture, left orbital rim, possible orbital floor fracture and closed reduction of nasal bones and septums  I explained that this will require multiple facial incisions  through transoral and transfacial approaches as needed for exposure of fracture segments       I discussed the incisions, placement of hardware, hospitalization, recovery time, and postoperative restrictions  I discussed risks including bleeding, infection, fluid collection, scarring, wound breakdown, need for further surgery, malocclusion, retrobulbar hematoma, neurovascular structure injuries,  non-union, need for hardware removal, pain, numbness, and facial paralysis       We also discussed that given his delayed presentation, chances of anatomic reduction is decrease but still possible  Otherwise he would require formal osteotomies to recreate fragments prior to reduction  The patient understands all of these things and wishes to proceed and signed the consent form      Patient will be scheduled for surgery as soon as possible       All of the patient's questions were answered to their satisfaction      A total of 45 minutes of face-to-face time was spent in this encounter, of which >50% was spent in counseling         [id number] [address]   [address]   [address] [place]   Office: [phone]  Yesy: [phone]

## 2018-08-23 NOTE — H&P (VIEW-ONLY)
8/21/2018    Plastic and Reconstructive Surgery Consultation    Reason for Consultation: facial trauma    HPI:   Esperanza Killian is a 48 y o  male  presenting with a history of recent trauma to the face  Patient explains that he has known seizure disorder for many years and recently had a seizure while walking and sustained fall hitting his left side of the face with sidewalk  Patient was evaluated at outside hospital that required hospitalization from 7/22/2018 to 7/24/2018 and underwent full trauma work up in which he was found to have some facial injuries  He was evaluated by a plastic surgeon as outpatient but due to insurance it was referred to us for further evaluation  Patient complaints of pain on his left side of face and worsens with biting and motion in general  Swelling has improved  He has baseline left sided peripheral vision decrease  He denies double or blurry vision  Past Medical History:  Past Medical History:   Diagnosis Date    Seizures ([facility] [address] )        Past Surgical History:  Past Surgical History:   Procedure Laterality Date    BACK SURGERY         Medications:    Current Outpatient Prescriptions:     ALPRAZolam (XANAX) 0 25 mg tablet, Take 0 25 mg by mouth Three times a day, Disp: , Rfl:     divalproex sodium (DEPAKOTE ER) 500 mg 24 hr tablet, Take 500 mg by mouth daily Take 500 mg in AM and 1000 mg before bed , Disp: , Rfl:       Allergies: Allergies   Allergen Reactions    Bee Venom        Family History:  family history is not on file  Social History:  Social History     Social History    Marital status:      Spouse name: N/A    Number of children: N/A    Years of education: N/A     Occupational History    Not on file       Social History Main Topics    Smoking status: Current Some Day Smoker    Smokeless tobacco: Never Used    Alcohol use Yes    Drug use: No    Sexual activity: Not on file     Other Topics Concern    Not on file     Social History Narrative    No narrative on file       Review of Systems:   A comprehensive 12-point review of systems is negative except those things noted in HPI above  No F/C  No CP/SOB  Negative for problems with anesthesia, surgery, bleeding, hypercoagulable state or wound healing problems  Physical Examination  Vitals:  Ht 6' (1 829 m)   Wt 99 3 kg (219 lb)   BMI 29 70 kg/m²   General: NAD, well appearing, Ox3  HEENT:  - Appearance:   Edema: none   Ecchymosis: none    Other:  Left malar flatten  - Lacerations/wounds: none  - Enophthalmos: none  - Vertical dystopia: none  - Scars: none  - CN 2-12: grossly intact, including PERRL and EOMI   - V1-V3: grossly intact  - VII: grossly intact bilaterally  - Facial instability/stepoffs: yes, orbital rim step off, tender to palpation at multiple sutures  Nasal bone incongruence and step offs  - Tenderness: yes  - Intraoral: Class I occlusion, no injuries  - Intranasal: moderate nasal obstruction with significant right side septal deviation/ no septal hematoma; [place] maneuver positive  - LAD: none  - Neck: non-tender along cervical spine  Chest: RRR, unlabored breathing on room air  Abdomen: Soft/NT/ND, no organomegaly  Extremities: Neurovascularly intact,       Facial CT  - mildly displaced fracture lateral wall left orbit   - minimally displaced fracture left orbital floor  - minimally displaced fracture left zygomatic arch   - minimally displaced fracture anterior wall left maxillary   - nondisplaced fracture posterolateral inferior wall left maxillary sinus   - mildly displaced bilateral nasal fracture        Assessment & Plan  Blessing Cruz is a 48 y o  male  s/p multiple facial bone fractures with delayed presentation  Patient still very symptomatic and step offs along the rim   Elan Beaver MD, have discussed surgery with the patient and recommended to attempt open reduction and internal fixation of left zygoma tripod complex fracture, left orbital rim, possible orbital floor fracture and closed reduction of nasal bones and septums  I explained that this will require multiple facial incisions  through transoral and transfacial approaches as needed for exposure of fracture segments  I discussed the incisions, placement of hardware, hospitalization, recovery time, and postoperative restrictions  I discussed risks including bleeding, infection, fluid collection, scarring, wound breakdown, need for further surgery, malocclusion, retrobulbar hematoma, neurovascular structure injuries,  non-union, need for hardware removal, pain, numbness, and facial paralysis  We also discussed that given his delayed presentation, chances of anatomic reduction is decrease but still possible  Otherwise he would require formal osteotomies to recreate fragments prior to reduction  The patient understands all of these things and wishes to proceed and signed the consent form  Patient will be scheduled for surgery as soon as possible  All of the patient's questions were answered to their satisfaction  A total of 45 minutes of face-to-face time was spent in this encounter, of which >50% was spent in counseling        [facility] [id number] [facility]   [address]   [address]   [address] [place]   Office: [phone]  Yesy: [phone]

## 2018-08-23 NOTE — INTERVAL H&P NOTE
H&P reviewed  After examining the patient I find no changes in the patients condition since the H&P had been written    3

## 2018-08-24 VITALS
WEIGHT: 219 LBS | BODY MASS INDEX: 29.66 KG/M2 | OXYGEN SATURATION: 100 % | RESPIRATION RATE: 18 BRPM | SYSTOLIC BLOOD PRESSURE: 132 MMHG | HEIGHT: 72 IN | TEMPERATURE: 97.6 F | HEART RATE: 83 BPM | DIASTOLIC BLOOD PRESSURE: 71 MMHG

## 2018-08-24 PROCEDURE — 99024 POSTOP FOLLOW-UP VISIT: CPT | Performed by: PLASTIC SURGERY

## 2018-08-24 RX ORDER — OXYCODONE AND ACETAMINOPHEN 10; 325 MG/1; MG/1
1 TABLET ORAL EVERY 4 HOURS PRN
Qty: 20 TABLET | Refills: 0 | Status: SHIPPED | OUTPATIENT
Start: 2018-08-24 | End: 2018-08-30

## 2018-08-24 RX ORDER — BACITRACIN 500 [USP'U]/G
OINTMENT OPHTHALMIC 3 TIMES DAILY
Qty: 3.5 G | Refills: 0 | Status: SHIPPED | OUTPATIENT
Start: 2018-08-24 | End: 2018-10-03 | Stop reason: SDUPTHER

## 2018-08-24 RX ORDER — TOBRAMYCIN AND DEXAMETHASONE 3; 1 MG/ML; MG/ML
1 SUSPENSION/ DROPS OPHTHALMIC
Qty: 5 ML | Refills: 0 | Status: SHIPPED | OUTPATIENT
Start: 2018-08-24 | End: 2021-04-01 | Stop reason: ALTCHOICE

## 2018-08-24 RX ORDER — AMOXICILLIN AND CLAVULANATE POTASSIUM 400; 57 MG/1; MG/1
1 TABLET, CHEWABLE ORAL EVERY 12 HOURS SCHEDULED
Qty: 14 TABLET | Refills: 0 | Status: SHIPPED | OUTPATIENT
Start: 2018-08-24 | End: 2018-08-31 | Stop reason: ALTCHOICE

## 2018-08-24 RX ADMIN — OXYCODONE HYDROCHLORIDE AND ACETAMINOPHEN 1 TABLET: 5; 325 TABLET ORAL at 07:31

## 2018-08-24 RX ADMIN — TOBRAMYCIN 1 DROP: 3 SOLUTION OPHTHALMIC at 10:13

## 2018-08-24 RX ADMIN — DIVALPROEX SODIUM 500 MG: 500 TABLET, EXTENDED RELEASE ORAL at 08:26

## 2018-08-24 RX ADMIN — AMOXICILLIN AND CLAVULANATE POTASSIUM 1 TABLET: 500; 125 TABLET, FILM COATED ORAL at 05:09

## 2018-08-24 RX ADMIN — HYDROMORPHONE HYDROCHLORIDE 1 MG: 1 INJECTION, SOLUTION INTRAMUSCULAR; INTRAVENOUS; SUBCUTANEOUS at 05:22

## 2018-08-24 RX ADMIN — SODIUM CHLORIDE, SODIUM LACTATE, POTASSIUM CHLORIDE, AND CALCIUM CHLORIDE 125 ML/HR: .6; .31; .03; .02 INJECTION, SOLUTION INTRAVENOUS at 05:13

## 2018-08-24 RX ADMIN — OXYCODONE HYDROCHLORIDE AND ACETAMINOPHEN 1 TABLET: 5; 325 TABLET ORAL at 11:34

## 2018-08-24 RX ADMIN — HEPARIN SODIUM 5000 UNITS: 5000 INJECTION, SOLUTION INTRAVENOUS; SUBCUTANEOUS at 05:09

## 2018-08-24 RX ADMIN — HYDROMORPHONE HYDROCHLORIDE 1 MG: 1 INJECTION, SOLUTION INTRAMUSCULAR; INTRAVENOUS; SUBCUTANEOUS at 01:51

## 2018-08-24 RX ADMIN — TOBRAMYCIN 1 DROP: 3 SOLUTION OPHTHALMIC at 05:09

## 2018-08-24 NOTE — DISCHARGE SUMMARY
Discharge Summary - Plastic Surgery   Tabitha Brody 48 y o  male MRN: [id number]  Unit/Bed#: -01 Encounter: [id number]    Admission Date:      Discharge Date: 8/24/18    Admitting Diagnosis: Open fracture of nasal bone with delayed healing, subsequent encounter [S02  2XXG]    Discharge Diagnosis: ZMC fracture and Nasal Bone fracture    Resolved Problems  Date Reviewed: 8/21/2018    None          Attending: Dr Madyson Ch Physician(s)NAD    Procedures Performed: ORIF of [address] left and closed reduction of nasal bone fracture    Hospital Course: Patient admitted overnight for pain controlled  Patient did well tolerated clear liquid and soft diet  Pain was transitioned to oral pain control and patient was stable for discharge  Condition at Discharge: good     Discharge instructions/Information to patient and family:   See after visit summary for information provided to patient and family  Provisions for Follow-Up Care:  See after visit summary for information related to follow-up care and any pertinent home health orders  Disposition: See After Visit Summary for discharge disposition information  Planned Readmission: No    Discharge Statement   I spent 15 minutes discharging the patient  This time was spent on the day of discharge  I had direct contact with the patient on the day of discharge  Additional documentation is required if more than 30 minutes were spent on discharge  Discharge Medications:  See after visit summary for reconciled discharge medications provided to patient and family

## 2018-08-24 NOTE — PLAN OF CARE
DISCHARGE PLANNING     Discharge to home or other facility with appropriate resources Adequate for Discharge        Knowledge Deficit     Patient/family/caregiver demonstrates understanding of disease process, treatment plan, medications, and discharge instructions Adequate for Discharge        PAIN - ADULT     Verbalizes/displays adequate comfort level or baseline comfort level Adequate for Discharge        Potential for Falls     Patient will remain free of falls Adequate for Discharge

## 2018-08-24 NOTE — PROGRESS NOTES
Patient resting comfortably at this time  IVF running  Call bell within reach  Will continue to monitor

## 2018-08-24 NOTE — SOCIAL WORK
Pt reports he will have no way to get home until around 5pm  Pt agreeable to using LYFT  CM received approval from [facility]  CM had Pt sign LYFT consent and placed in scanning bin  Pt would like to go to his friend's house at [address] [facility] [facility] in [facility] [facility] as he doesn't want to be alone today  CM arranged LYFT through [facility] for 12 noon  Nurse Felicia Purchase aware  Pt agreeable to having Percocet prescription filled at [facility], CM sent script to [facility] and it was delivered to Pt  Pt had three prescriptions electronically sent to [facility] in [facility] and that is not the pharmacy he uses, Pt states he uses [address] [place] [address] on [address] [place] [address] in [place]  CM called [facility] on [address] and spoke to Abdullahi who reports she will call [facility] in [place] [facility] and have scripts transferred, CM made Pt aware

## 2018-08-24 NOTE — DISCHARGE INSTRUCTIONS
Nasal Fracture   WHAT YOU NEED TO KNOW:   A nasal fracture is a crack or break in your nose  You may have a break in the upper nose (bridge), the side, or in the septum  The septum is in the middle of the nose and divides your nostrils  Nasal fractures are caused by a hard hit to the nose  They may be caused by a motor vehicle crash, sports injury, fall, or a fight  DISCHARGE INSTRUCTIONS:   Return to the emergency department if:   · You feel like one or both of your nasal passages are blocked and you have trouble breathing  · Clear fluid is leaking from your nose  · Your have severe nose pain, even after you take medicine  · You have double vision or have problems moving your eyes  Contact your healthcare provider if:   · You have a fever  · You continue to have nosebleeds  · You have a headache that gets worse, even after you take pain medicine  · Your splint or packing are loose  · You have questions about your condition or care  Medicines:   · Medicine  may be given to decrease pain or help prevent a bacterial infection  Ask how to take pain medicine safely  Medicine may also be given to decrease nasal swelling and help make breathing easier  · Take your medicine as directed  Contact your healthcare provider if you think your medicine is not helping or if you have side effects  Tell him or her if you are allergic to any medicine  Keep a list of the medicines, vitamins, and herbs you take  Include the amounts, and when and why you take them  Bring the list or the pill bottles to follow-up visits  Carry your medicine list with you in case of an emergency  Wound care:  Ask your healthcare provider how to care for your wounds, splint, or packing  How to care for your nasal fracture:   · Apply ice  on your nose for 15 to 20 minutes every hour or as directed  Use an ice pack, or put crushed ice in a plastic bag  Cover it with a towel   Ice helps prevent tissue damage and decreases swelling and pain  · Elevate  your head when you lie down  This will help decrease swelling and pain  You may need to see a specialist 3 to 5 days later for tests or more treatment after swelling has decreased  · Protect your nose  to prevent bleeding, bruising, or another fracture  Try not to bump your nose on anything  You may not be able to participate in sports for up to 6 weeks  Follow up with a specialist or your healthcare provider in 2 to 5 days as directed:  Write down any questions you have so you remember to ask them during your visits  Sometimes follow-up care is needed months or even years later to correct problems  © 2017 [employer]  Information is for End User's use only and may not be sold, redistributed or otherwise used for commercial purposes  All illustrations and images included in CareNotes® are the copyrighted property of [employer]  or [employer] Parekh  The above information is an  only  It is not intended as medical advice for individual conditions or treatments  Talk to your doctor, nurse or pharmacist before following any medical regimen to see if it is safe and effective for you  Facial Fracture   WHAT YOU NEED TO KNOW:   A facial fracture is a break in one or more of the bones in your face  The bones in your face include those around your eye, your cheekbones, and the bones of your nose and jaw  A facial fracture may also cause damage to nearby tissue  DISCHARGE INSTRUCTIONS:   Medicines:   · Decongestant medicine:  Decongestants help decrease swelling in your nose and sinuses  This medicine may also help you breathe easier  · Pain medicine: You may be given a prescription medicine to decrease pain  Do not wait until the pain is severe before you take this medicine  · Steroid medicine: This medicine helps decrease swelling in your face      · Antibiotic medicine:  Antibiotic medicine helps treat an infection caused by bacteria  This medicine may be given if you have an open wound  · Take your medicine as directed  Contact your healthcare provider if you think your medicine is not helping or if you have side effects  Tell him of her if you are allergic to any medicine  Keep a list of the medicines, vitamins, and herbs you take  Include the amounts, and when and why you take them  Bring the list or the pill bottles to follow-up visits  Carry your medicine list with you in case of an emergency  Follow up with your healthcare provider as directed:  Write down your questions so you remember to ask them during your visits  Nutrition:  You may not be able to eat solid food for a period of time  You may first be started on a liquid diet  Examples of liquids you may be able to have include, water, broth, gelatin, apple juice, or lemon-lime soda pop  After a few days, you may be allowed to eat soft foods, such as applesauce, bananas, cooked cereal, cottage cheese, pudding, and yogurt  Ask for more information about the type of foods you can eat  Rehabilitation:  If you had surgery to fix your facial fracture, you may need oral and facial rehabilitation  This is done to restore normal use and movement of your facial muscles  Ask for more information about rehabilitation  Help prevent a facial fracture:   · Wear a helmet when you ride a bicycle or a motorcycle  · Wear a seatbelt at all times when you are inside a motor vehicle  · Wear protective headgear and eyewear during sporting activities  Self-care:   · Apply ice:  Ice helps decrease swelling and pain  Ice may also help prevent tissue damage  Use an ice pack or put crushed ice in a plastic bag  Cover it with a towel and place it on your face for 15 to 20 minutes every hour as directed  · Keep your head elevated:  Keep you head above the level of your heart as often as you can  This will help decrease swelling and pain   Prop your head on pillows or blankets to keep it elevated comfortably  · Avoid putting pressure on your face:      ¨ Do not sleep on the injured side of your face  Pressure on the area of your injury may cause further damage  ¨ Sneeze with your mouth open to decrease pressure on your broken facial bones  Too much pressure from a sneeze may cause your broken bones to move and cause more damage  ¨ Try not to blow your nose because it may cause more damage if you have a fracture near your eye  The pressure from blowing your nose may pinch the nerve of your eye and cause permanent damage  · Clean your mouth carefully: Mouthwash BID   Contact your healthcare provider if:   · You are bleeding from a wound on your face  · You have double vision or you suddenly have problems with your eyesight  · You have questions or concerns about your condition or care  Return to the emergency department if:   · You have clear or pinkish fluid draining from your nose or mouth  · You have numbness in your face  · You have worsening pain in your eye or face  · You suddenly have trouble chewing or swallowing  · You suddenly feel lightheaded and short of breath  · You have chest pain when you take a deep breath or cough  You may cough up blood  · Your arm or leg feels warm, tender, and painful  It may look swollen and red  © 2017 [address] Information is for End User's use only and may not be sold, redistributed or otherwise used for commercial purposes  All illustrations and images included in CareNotes® are the copyrighted property of [employer]  or Alec Parekh  The above information is an  only  It is not intended as medical advice for individual conditions or treatments  Talk to your doctor, nurse or pharmacist before following any medical regimen to see if it is safe and effective for you

## 2018-08-24 NOTE — PROGRESS NOTES
Progress Note - Plastic Surgery   Carole Grayson 48 y o  male MRN: [id number]  Unit/Bed#: -01 Encounter: [id number]    Assessment:  POD 1  S/p ORIF of L ZMC and closed reduction of nasal  Bone fractures    Plan:  - Tobradex eye drops BID   - PO Augmentin   - PO pain control with percocet   - Soft Diet   - OOB   - Discharge home today   - Follow up in one week   - Continue frost stitch until follow up     Subjective/Objective     Subjective: Pain doing well   With some pain controlled     Objective:    Blood pressure 132/71, pulse 83, temperature 97 6 °F (36 4 °C), temperature source Oral, resp  rate 18, height 6' (1 829 m), weight 99 3 kg (219 lb), SpO2 100 %  ,Body mass index is 29 7 kg/m²        Intake/Output Summary (Last 24 hours) at 08/24/18 6808  Last data filed at 08/23/18 2257   Gross per 24 hour   Intake          2693 83 ml   Output             2250 ml   Net           443 83 ml       Invasive Devices     Peripheral Intravenous Line            Peripheral IV 08/23/18 Right Hand 1 day                Physical Exam:   Gen NAD  HEENT: Huntley stitch in place  EOMI b/l   Sensation diminished in V2 on the left  Mild amount of swelling and ecchymosis   Nasal splints in place      Lab, Imaging and other studies:  CBC:   Lab Results   Component Value Date     08/23/2018    MPV 10 5 08/23/2018     VTE Pharmacologic Prophylaxis: Sequential compression device (Venodyne)   VTE Mechanical Prophylaxis: sequential compression device

## 2018-08-27 NOTE — OP NOTE
OPERATIVE REPORT  PATIENT NAME: Raul Robins    :  1965  MRN: [id number]  Pt Location: AN OR ROOM 04    SURGERY DATE: 2018    Surgeon(s) and Role:     * Roberto Seals MD - Primary    Preop Diagnosis:  CLosed fracture of nasal bone with delayed healing, subsequent encounter [S02  2XXG]    Post-Op Diagnosis Codes:     * CLosed fracture of nasal bone with delayed healing, subsequent encounter [S02  2XXG]    Procedure(s) (LRB):  OPEN REDUCTION W/ INTERNAL FIXATION (ORIF) ZYGOMATIC FRACTURE (Left)  CLOSED REDUCTION NASAL FRACTURE (N/A)     Specimen(s):  * No specimens in log *    Estimated Blood Loss:   50 mL    Drains: none        Anesthesia Type:   General    Operative Indications:  Closed fracture of nasal bone with delayed healing, subsequent encounter [S02  2XXG]      Operative Findings:  Mild zygoma tripod complex healing fracture line with mild displacement zygomatico sphenoid, maxilla and orbital rim  Complications:   None    Procedure and Technique:  The patient was taken to the operating room theater and placed in supine position  All hemodynamic lines and monitoring devices were in place, SCDs were on and functioning  Preoperative antibiotics were addressed and the face was prepped and dapped in usual sterile fashion  A time out was performed and 2 cm upper lateral blepharoplasty type incision was marked along a crease  The lateral brow, left lower eyelid, left malar region and left upper gingivobuccal sulcus was infiltrated with 10 ml of marcaine 0 25% with epinephrine  Procedure started by 1st wide exposure to the fracture sites  Left upper lateral eyelid incision was made the the scalpel carried down using electrocautery, dividing  Lateral fibers of orbicularis oculi fibers, down to area of lateral orbit rim  Using freer elevator the periosteum overlying the zygomaticofrontal suture was elevated exposing a minimally displaced fracture along the suture with some callus formation  The fracture site was debrided and irrigated  Cornea protector was placed  Then two lower eyelid retraction sutures were placed using 4-0 silk along the lid margin incorporating the tarsus  A transconjunctival approach was used to expose the orbital rim and floor exploration, using a colorado tip bolvie a horizontal incision parallel to the lid margin and 1/3 the distance from tarsus and inferior fornix in the conjunctiva  Once the retroseptal space was encountered, a 4-0 silk suture was placed for cephalad traction on the the conjunctiva  Using sharp scissors and electrocautery the incision was carried down the orbital rim  This was incised along the entire rim and using a freer elevator the periosteum was raised off the rim and continued along the orbital floor until the zygomaticosphenoid suture was encountered  Careful attention was placed as gentle handling of the globe A mild displaced fracture along medial orbital rim and small steep off ZS suture  Both were debrided and irrigated  There was no significant floor involvement  Attention was turned to intraoral region were an incision was made with electrocauthery along the left upper gingivobucal sulcus down to maxilla  Periosteum was raised as afar cephalad to exposed the body of zygoma  mildy depression at the ZM suture with callus was found  This was also debrided and irrigated  A septal displaced was used to reduce the zygoma complex via intraoral and temporal approach until proper alignment along fracture sites and malar fullness compared to contralateral face  Fixation started from cephalad, using a miniplate 0 5 mm, spanned across ZF suture and secured with #5 selftapping screws x 4  Then a curved miniplate, 0 5 mm profile was expand across the rim fracture and secure with five #6 self tapping screws  The Zm suture was placed with 0 7 mm profile L-shaped miniplate modified as needed and secure with five self tapping screws #7   Stable zygoma fixation was obtained and all wound sites were irrigated  The lateral lid incision was closed with interrupted 3-0 Monocryl to reapproximate muscle over the hardware and deep dermasl; and interrupted 6-0 prolene sutures for skin  Left malar tissue were resuspended to the rim placed using 3-0 monocryl suture  Several simple 6-0 fast absorbing gut was used to reapproximate the conjunctiva  The intraoral incision was closed withi running 4-0 chromic suture  Opthalmic antibiotic ointment was applied to all suture line  Cornea protectors were removed and force duction text was performed confirming free movement of the globe and no entrapment  A frost 4-0 silk suture was placed and taped to forehead for lower lid protection  Following this, the nose was evaluated noticing large step offs along nasal bones with collapse of internal nasal valve  Using a septal displacer the nasal bones were reduced to a more anatomic position and improving the internal nasal valve  Of notice, the fractures are over 4 weeks so displacement obtained was impaired by healing of some site  Bustamante splints were placed and secured with 2-0 prolene to right nostril and a [place] splint was placed externally  I was present for the entire procedure and performed all the critical portions of the operation       Patient Disposition:  PACU  and hemodynamically stable    SIGNATURE: Song Blakely MD  DATE: August 27, 2018  TIME: 1:41 PM

## 2018-08-30 DIAGNOSIS — S02.40FA ZYGOMATIC FRACTURE, LEFT SIDE, INITIAL ENCOUNTER FOR CLOSED FRACTURE (HCC): ICD-10-CM

## 2018-08-30 RX ORDER — OXYCODONE AND ACETAMINOPHEN 10; 325 MG/1; MG/1
1 TABLET ORAL EVERY 6 HOURS PRN
Qty: 15 TABLET | Refills: 0 | Status: SHIPPED | OUTPATIENT
Start: 2018-08-30 | End: 2018-09-09

## 2018-08-31 ENCOUNTER — OFFICE VISIT (OUTPATIENT)
Dept: PLASTIC SURGERY | Facility: CLINIC | Age: 53
End: 2018-08-31

## 2018-08-31 VITALS — HEIGHT: 73 IN | BODY MASS INDEX: 29.03 KG/M2 | WEIGHT: 219 LBS

## 2018-08-31 DIAGNOSIS — Z09 POSTOP CHECK: Primary | ICD-10-CM

## 2018-08-31 PROCEDURE — 99024 POSTOP FOLLOW-UP VISIT: CPT | Performed by: PLASTIC SURGERY

## 2018-08-31 NOTE — PROGRESS NOTES
Kisha Stein is 1 week post-op: left zygoma tripod complex ORIF and closed nasal reduction  Presenting for routine follow-up  S:  Doing well  Patient has noted no excessive swelling, pain and discharge  O:  Nose: splint removed, nasal bone contour much improved          Eye: mild edema lower lid, conjunctiva incision well healed, no enophthalmos, PEERLA, EOMI, no evidence of entrapment  Left supraorbital incision healing well  Oral: left gingiva suture line intact  Cheek: good malar symmetry  A:  I discussed the expected recovery after this procedure  Patient instructed to stop oral antibiotics  Ok to use eye drops as needed for lubrication  Nasal precautions was instructed  P: splints removed  Patient to return in 4 weeks  Patient is to return as needed for redness, swelling, discomfort, or any concern about his surgery

## 2018-10-03 ENCOUNTER — OFFICE VISIT (OUTPATIENT)
Dept: PLASTIC SURGERY | Facility: CLINIC | Age: 53
End: 2018-10-03

## 2018-10-03 VITALS — WEIGHT: 218 LBS | BODY MASS INDEX: 28.89 KG/M2 | HEIGHT: 73 IN

## 2018-10-03 DIAGNOSIS — S02.40FA ZYGOMATIC FRACTURE, LEFT SIDE, INITIAL ENCOUNTER FOR CLOSED FRACTURE (HCC): ICD-10-CM

## 2018-10-03 PROCEDURE — 99024 POSTOP FOLLOW-UP VISIT: CPT | Performed by: PLASTIC SURGERY

## 2018-10-03 RX ORDER — CARBOXYMETHYLCELLULOSE SODIUM 5 MG/ML
2 SOLUTION/ DROPS OPHTHALMIC 3 TIMES DAILY PRN
Qty: 1 BOTTLE | Refills: 0 | Status: CANCELLED | OUTPATIENT
Start: 2018-10-03

## 2018-10-03 RX ORDER — BACITRACIN 500 [USP'U]/G
OINTMENT OPHTHALMIC
Qty: 3.5 G | Refills: 0 | Status: CANCELLED | OUTPATIENT
Start: 2018-10-03

## 2018-10-03 RX ORDER — CARBOXYMETHYLCELLULOSE SODIUM 5 MG/ML
2 SOLUTION/ DROPS OPHTHALMIC 3 TIMES DAILY PRN
Qty: 1 BOTTLE | Refills: 3 | Status: SHIPPED | OUTPATIENT
Start: 2018-10-03 | End: 2021-04-01 | Stop reason: ALTCHOICE

## 2018-10-03 RX ORDER — BACITRACIN 500 [USP'U]/G
OINTMENT OPHTHALMIC
Qty: 3.5 G | Refills: 2 | Status: SHIPPED | OUTPATIENT
Start: 2018-10-03

## 2018-10-03 NOTE — PROGRESS NOTES
Assessment/Plan:      Diagnoses and all orders for this visit:    Zygomatic fracture, left side, initial encounter for closed fracture (HCC)  -     carboxymethylcellulose 0 5 % SOLN; Administer 2 drops into the left eye 3 (three) times a day as needed for dry eyes  -     Light Mineral Oil-Mineral Oil (RETAINE MGD) 0 5-0 5 % EMUL; Apply to eye daily for 30 days Apply 2 drops every eye in the morning as needed  -     bacitracin ophthalmic ointment; Administer into the left eye daily at bedtime Apply to left lower eyelid and massage daily before going to bed        I discussed with Mr Ayan Flores dry eye symptoms are expected after recent eyelid surgery and will improve with time  No evidence of eyelid malposition, no significant sclera show  Good correction of malar projection and no evidence of enophthalmos  I recommended to use daily Retaine MGD and lid massage with ointment at night  Avoid contact sports, no other activity restrictions  Will follow up in 3 months  Rasheeda Prietoagena [address] [place], [place] [address], [address] [facility]   Office: [phone]        Subjective:     Patient ID: Farhad Miller is a 48 y o  male  Mr Ayan Flores is 5 weeks post op left ZMC tripod ORIF who presents for routine follow up    Patient complaints of dry eye and has to routenaly use artificial tears and mild numbness on cheek  Otherwise, no pain, no visual distubance or changes  Review of Systems      Objective:     Physical Exam   Constitutional: He appears well-developed  HENT:   Good projection of malar cheek bilateral     Well healed left eyelid and upper gingivobuccal sulcus incision  Eyes: Pupils are equal, round, and reactive to light  Conjunctivae and EOM are normal  Lids are everted and swept, no foreign bodies found     Left eye: Conjunctive scar well healed,lower lid position very symmetric, normal snap back test, no evidence of entropion or ectropion  Cardiovascular: Normal rate      Pulmonary/Chest: Effort normal

## 2021-04-01 PROBLEM — F10.20 ALCOHOL USE DISORDER, SEVERE, IN CONTROLLED ENVIRONMENT (HCC): Status: ACTIVE | Noted: 2021-04-01

## 2021-04-01 PROBLEM — F31.89 ATYPICAL BIPOLAR DISORDER (HCC): Status: ACTIVE | Noted: 2021-04-01

## 2021-04-01 PROBLEM — G40.909 SEIZURE DISORDER (HCC): Status: ACTIVE | Noted: 2021-04-01

## 2021-12-19 ENCOUNTER — HOSPITAL ENCOUNTER (EMERGENCY)
Facility: HOSPITAL | Age: 56
Discharge: HOME | End: 2021-12-20
Attending: EMERGENCY MEDICINE | Admitting: EMERGENCY MEDICINE
Payer: COMMERCIAL

## 2021-12-19 VITALS
TEMPERATURE: 97.7 F | OXYGEN SATURATION: 96 % | RESPIRATION RATE: 18 BRPM | DIASTOLIC BLOOD PRESSURE: 95 MMHG | SYSTOLIC BLOOD PRESSURE: 147 MMHG | WEIGHT: 223 LBS | BODY MASS INDEX: 31.54 KG/M2 | HEART RATE: 104 BPM

## 2021-12-19 DIAGNOSIS — M54.50 ACUTE EXACERBATION OF CHRONIC LOW BACK PAIN: ICD-10-CM

## 2021-12-19 DIAGNOSIS — F41.9 ANXIETY: ICD-10-CM

## 2021-12-19 DIAGNOSIS — G89.29 ACUTE EXACERBATION OF CHRONIC LOW BACK PAIN: ICD-10-CM

## 2021-12-19 DIAGNOSIS — F10.10 ALCOHOL ABUSE: Primary | ICD-10-CM

## 2021-12-19 PROCEDURE — 82075 ASSAY OF BREATH ETHANOL: CPT | Performed by: EMERGENCY MEDICINE

## 2021-12-19 PROCEDURE — 99284 EMERGENCY DEPT VISIT MOD MDM: CPT | Performed by: EMERGENCY MEDICINE

## 2021-12-19 PROCEDURE — 99284 EMERGENCY DEPT VISIT MOD MDM: CPT

## 2021-12-19 RX ORDER — IBUPROFEN 600 MG/1
600 TABLET ORAL ONCE
Status: COMPLETED | OUTPATIENT
Start: 2021-12-19 | End: 2021-12-20

## 2021-12-19 RX ORDER — ACETAMINOPHEN 325 MG/1
975 TABLET ORAL ONCE
Status: COMPLETED | OUTPATIENT
Start: 2021-12-19 | End: 2021-12-20

## 2021-12-19 RX ORDER — METHOCARBAMOL 500 MG/1
1000 TABLET, FILM COATED ORAL ONCE
Status: COMPLETED | OUTPATIENT
Start: 2021-12-19 | End: 2021-12-20

## 2021-12-19 RX ORDER — LORAZEPAM 1 MG/1
1 TABLET ORAL ONCE
Status: COMPLETED | OUTPATIENT
Start: 2021-12-20 | End: 2021-12-20

## 2021-12-20 LAB
AMPHETAMINES SERPL QL SCN: NEGATIVE
BARBITURATES UR QL: NEGATIVE
BENZODIAZ UR QL: NEGATIVE
COCAINE UR QL: NEGATIVE
ETHANOL EXG-MCNC: 0.04 MG/DL
FLUAV RNA RESP QL NAA+PROBE: NEGATIVE
FLUBV RNA RESP QL NAA+PROBE: NEGATIVE
METHADONE UR QL: NEGATIVE
OPIATES UR QL SCN: NEGATIVE
OXYCODONE+OXYMORPHONE UR QL SCN: NEGATIVE
PCP UR QL: NEGATIVE
RSV RNA RESP QL NAA+PROBE: NEGATIVE
SARS-COV-2 RNA RESP QL NAA+PROBE: NEGATIVE
THC UR QL: POSITIVE

## 2021-12-20 PROCEDURE — 0241U HB NFCT DS VIR RESP RNA 4 TRGT: CPT | Performed by: EMERGENCY MEDICINE

## 2021-12-20 PROCEDURE — 80307 DRUG TEST PRSMV CHEM ANLYZR: CPT | Performed by: EMERGENCY MEDICINE

## 2021-12-20 PROCEDURE — NC001 PR NO CHARGE: Performed by: EMERGENCY MEDICINE

## 2021-12-20 RX ORDER — DIAZEPAM 5 MG/1
5 TABLET ORAL ONCE
Status: COMPLETED | OUTPATIENT
Start: 2021-12-20 | End: 2021-12-20

## 2021-12-20 RX ADMIN — ACETAMINOPHEN 975 MG: 325 TABLET, FILM COATED ORAL at 00:02

## 2021-12-20 RX ADMIN — METHOCARBAMOL 1000 MG: 500 TABLET, FILM COATED ORAL at 00:02

## 2021-12-20 RX ADMIN — DIAZEPAM 5 MG: 5 TABLET ORAL at 09:24

## 2021-12-20 RX ADMIN — IBUPROFEN 600 MG: 600 TABLET, FILM COATED ORAL at 00:02

## 2021-12-20 RX ADMIN — LORAZEPAM 1 MG: 1 TABLET ORAL at 01:26

## 2023-02-20 ENCOUNTER — TELEPHONE (OUTPATIENT)
Dept: UROLOGY | Facility: MEDICAL CENTER | Age: 58
End: 2023-02-20

## 2023-02-20 NOTE — TELEPHONE ENCOUNTER
Please Triage  New Patient    What is the reason for the patient’s appointment? PSA elevation    What office location does the patient prefer? [place]     Imaging/Lab Results:    Do we accept the patient's insurance or is the patient Self-Pay? Insurance Provider: [employer]   Plan Type/Number:  Member ID#: Has the patient had any previous Urologist(s)? no    Have patient records been requested? If not are records showing in Epic:     Has the patient had any outside testing done? In epic   Does the patient have a personal history of cancer?   no
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
## 2023-03-21 ENCOUNTER — OFFICE VISIT (OUTPATIENT)
Dept: UROLOGY | Facility: AMBULATORY SURGERY CENTER | Age: 58
End: 2023-03-21

## 2023-03-21 VITALS
BODY MASS INDEX: 31.64 KG/M2 | DIASTOLIC BLOOD PRESSURE: 92 MMHG | SYSTOLIC BLOOD PRESSURE: 140 MMHG | HEIGHT: 71 IN | WEIGHT: 226 LBS

## 2023-03-21 DIAGNOSIS — R39.16 BENIGN PROSTATIC HYPERPLASIA (BPH) WITH STRAINING ON URINATION: ICD-10-CM

## 2023-03-21 DIAGNOSIS — R97.20 PSA ELEVATION: Primary | ICD-10-CM

## 2023-03-21 DIAGNOSIS — Z80.42 FAMILY HISTORY OF PROSTATE CANCER: ICD-10-CM

## 2023-03-21 DIAGNOSIS — N40.1 BENIGN PROSTATIC HYPERPLASIA (BPH) WITH STRAINING ON URINATION: ICD-10-CM

## 2023-03-21 LAB — POST-VOID RESIDUAL VOLUME, ML POC: 59 ML

## 2023-03-21 RX ORDER — TAMSULOSIN HYDROCHLORIDE 0.4 MG/1
0.4 CAPSULE ORAL
Qty: 90 CAPSULE | Refills: 3 | Status: SHIPPED | OUTPATIENT
Start: 2023-03-21

## 2023-03-28 ENCOUNTER — APPOINTMENT (OUTPATIENT)
Dept: LAB | Age: 58
End: 2023-03-28

## 2023-03-28 ENCOUNTER — HOSPITAL ENCOUNTER (OUTPATIENT)
Dept: RADIOLOGY | Age: 58
Discharge: HOME | End: 2023-03-28

## 2023-03-28 DIAGNOSIS — R97.20 PSA ELEVATION: ICD-10-CM

## 2023-03-28 DIAGNOSIS — N40.1 BENIGN PROSTATIC HYPERPLASIA (BPH) WITH STRAINING ON URINATION: ICD-10-CM

## 2023-03-28 DIAGNOSIS — R39.16 BENIGN PROSTATIC HYPERPLASIA (BPH) WITH STRAINING ON URINATION: ICD-10-CM

## 2023-03-28 LAB
BUN SERPL-MCNC: 16 MG/DL (ref 5–25)
CREAT SERPL-MCNC: 0.86 MG/DL (ref 0.6–1.3)
GFR SERPL CREATININE-BSD FRML MDRD: 96 ML/MIN/1.73SQ M
PSA SERPL-MCNC: 4.3 NG/ML (ref 0–4)

## 2023-03-28 RX ADMIN — GADOBUTROL 10 ML: 604.72 INJECTION INTRAVENOUS at 12:13

## 2023-03-29 ENCOUNTER — TELEPHONE (OUTPATIENT)
Dept: UROLOGY | Facility: HOSPITAL | Age: 58
End: 2023-03-29

## 2023-03-29 DIAGNOSIS — R97.20 ELEVATED PSA: Primary | ICD-10-CM

## 2023-03-29 NOTE — TELEPHONE ENCOUNTER
----- Message from [id number] [address]ie Christianshabnam sent at 3/29/2023  7:55 AM EDT -----  Repeat PSA now 4 3, previously 5 28 (10/6/22)  PSA remains borderline elevated  Results of multiparametric MRI prostate remain pending  Patient was to follow up at last visit to review results and re-evaluate urinary symptoms with PVR, but no appointment was made  Please assist with scheduling office visit and if mpMRI results finalized before appointment, will call patient with results

## 2023-03-29 NOTE — TELEPHONE ENCOUNTER
LMOM to advise information above  Office number provided  Pt is to call back and schedule office visit

## 2023-04-03 ENCOUNTER — TELEPHONE (OUTPATIENT)
Dept: OTHER | Facility: OTHER | Age: 58
End: 2023-04-03

## 2023-04-03 NOTE — TELEPHONE ENCOUNTER
Spoke to patient to advise MRI results are not finalized and advised it takes 1-2 weeks for the results to come in  Advised we will contact him once they are finalized  Patient is understanding

## 2023-04-18 PROBLEM — I73.9 PERIPHERAL VASCULAR DISEASE, UNSPECIFIED (HCC): Status: ACTIVE | Noted: 2023-04-18

## 2023-04-26 ENCOUNTER — CONSULT (OUTPATIENT)
Dept: DERMATOLOGY | Facility: CLINIC | Age: 58
End: 2023-04-26

## 2023-04-26 VITALS — WEIGHT: 245.7 LBS | BODY MASS INDEX: 34.4 KG/M2 | TEMPERATURE: 97.7 F | HEIGHT: 71 IN

## 2023-04-26 DIAGNOSIS — L98.9 LESION OF FACE: ICD-10-CM

## 2023-04-26 DIAGNOSIS — D48.9 NEOPLASM OF UNCERTAIN BEHAVIOR: Primary | ICD-10-CM

## 2023-04-26 DIAGNOSIS — C44.319 BASAL CELL CARCINOMA OF RIGHT CHEEK: ICD-10-CM

## 2023-04-26 NOTE — PROGRESS NOTES
"[facility] Note     Patient Name: Raleigh Shelton  Encounter Date: 4/26/2023     Have you been cared for by a [place] [address] Dermatologist in the last 3 years and, if so, which description applies to you? NO  I am considered a \"new\" patient and must complete all patient intake questions  I am MALE/not capable of bearing children  REVIEW OF SYSTEMS:  Have you recently had or currently have any of the following? · Recent fever or chills? No  · Any non-healing wound? YES, Right cheek   PAST MEDICAL HISTORY:  Have you personally ever had or currently have any of the following? If \"YES,\" then please provide more detail  · Skin cancer (such as Melanoma, Basal Cell Carcinoma, Squamous Cell Carcinoma? No  · Tuberculosis, HIV/AIDS, Hepatitis B or C: No  · Systemic Immunosuppression such as Diabetes, Biologic or Immunotherapy, Chemotherapy, Organ Transplantation, Bone Marrow Transplantation No  · Radiation Treatment No   FAMILY HISTORY:  Any \"first degree relatives\" (parent, brother, sister, or child) with the following? • Skin Cancer, Pancreatic or Other Cancer? Keith Alfaro had breast cancer, father had prostate cancer   PATIENT EXPERIENCE:    • Do you want the Dermatologist to perform a COMPLETE skin exam today including a clinical examination under the \"bra and underwear\" areas? NO  • If necessary, do we have your permission to call and leave a detailed message on your Preferred Phone number that includes your specific medical information?   Yes      Allergies   Allergen Reactions   • Bee Venom Anaphylaxis      Current Outpatient Medications:   •  ARIPiprazole (ABILIFY) 10 mg tablet, , Disp: , Rfl:   •  losartan (COZAAR) 100 MG tablet, Take 1 tablet (100 mg total) by mouth daily, Disp: 90 tablet, Rfl: 3  •  QUEtiapine (SEROquel) 200 mg tablet, , Disp: , Rfl:   •  tamsulosin (FLOMAX) 0 4 mg, Take 1 capsule (0 4 mg total) by mouth daily with dinner, Disp: 90 capsule, Rfl: 3  •  bacitracin ophthalmic " ointment, Administer into the left eye daily at bedtime Apply to left lower eyelid and massage daily before going to bed (Patient not taking: Reported on 3/21/2023), Disp: 3 5 g, Rfl: 2  •  buPROPion (WELLBUTRIN XL) 150 mg 24 hr tablet, Take 1 tablet (150 mg total) by mouth 2 (two) times a day (Patient not taking: Reported on 3/21/2023), Disp: 180 tablet, Rfl: 3  •  clotrimazole-betamethasone (LOTRISONE) 1-0 05 % cream, Apply topically 2 (two) times a day (Patient not taking: Reported on 3/21/2023), Disp: 30 g, Rfl: 0  •  gabapentin (NEURONTIN) 300 mg capsule, Take 1 capsule (300 mg total) by mouth 3 (three) times a day (Patient not taking: Reported on 3/21/2023), Disp: 270 capsule, Rfl: 3  •  gabapentin (NEURONTIN) 400 mg capsule, , Disp: , Rfl:   •  gabapentin (NEURONTIN) 600 MG tablet, , Disp: , Rfl:   •  hydrochlorothiazide (HYDRODIURIL) 12 5 mg tablet, Take 1 tablet (12 5 mg total) by mouth daily, Disp: 90 tablet, Rfl: 3  •  hydrOXYzine HCL (ATARAX) 50 mg tablet, , Disp: , Rfl:   •  lamoTRIgine (LaMICtal) 25 mg tablet, , Disp: , Rfl:   •  OLANZapine (ZyPREXA) 10 mg tablet, , Disp: , Rfl:   •  prazosin (MINIPRESS) 2 mg capsule, Take 2 mg by mouth, Disp: , Rfl:   •  propranolol (INDERAL) 10 mg tablet, , Disp: , Rfl:   •  sildenafil (REVATIO) 20 mg tablet, TAKE 2 TO 5 TABLETS BY ORAL ROUTE EVERY DAY AS NEEDED APPROXIMATELY 1 HOUR BEFOR SEXUAL ACTIVITY (Patient not taking: Reported on 3/21/2023), Disp: 50 tablet, Rfl: 3  •  traZODone (DESYREL) 100 mg tablet, , Disp: , Rfl:           • Whom besides the patient is providing clinical information about today's encounter?   o NO ADDITIONAL HISTORIAN (patient alone provided history)    Physical Exam and Assessment/Plan by Diagnosis:    NEOPLASM OF UNCERTAIN BEHAVIOR OF SKIN    Physical Exam:  • (Anatomic Location); (Size and Morphological Description); (Differential Diagnosis):  o Right cheek; 1 3 cm x 1 cm ulcerated plaque; clinical Dx: BCC  • Pertinent "Positives:  • Pertinent Negatives: Additional History of Present Condition:  Patient here today as a new patient with spot of concern on the right cheek  Present for \"years\" per patient but originally was a freckle  He then said that it turned into a sore that drained blood occasionally  No history of personal or family skin cancer  Assessment and Plan:  • I have discussed with the patient that a sample of skin via a \"skin biopsy” would be potentially helpful to further make a specific diagnosis under the microscope  • Based on a thorough discussion of this condition and the management approach to it (including a comprehensive discussion of the known risks, side effects and potential benefits of treatment), the patient (family) agrees to implement the following specific plan:    o Procedure:  Skin Biopsy  After a thorough discussion of treatment options and risk/benefits/alternatives (including but not limited to local pain, scarring, dyspigmentation, blistering, possible superinfection, and inability to confirm a diagnosis via histopathology), verbal and written consent were obtained and portion of the rash was biopsied for tissue sample  See below for consent that was obtained from patient and subsequent Procedure Note  PROCEDURE TANGENTIAL (SHAVE) BIOPSY NOTE:    • Performing Physician: Dr Arango  • Anatomic Location; Clinical Description with size (cm); Pre-Op Diagnosis:   · Right cheek; 1 3 cm x 1 cm ulcerated plaque; ddx: rule out BCC  • Post-op diagnosis: Same     • Local anesthesia: 1% xylocaine with epi      • Topical anesthesia: None    • Hemostasis: Aluminum chloride       After obtaining informed consent  at which time there was a discussion about the purpose of biopsy  and low risks of infection and bleeding  The area was prepped and draped in the usual fashion  Anesthesia was obtained with 1% lidocaine with epinephrine   A shave biopsy to an appropriate sampling depth was obtained by " "Shave (Dermablade or 15 blade) The resulting wound was covered with surgical ointment and bandaged appropriately  The patient tolerated the procedure well without complications and was without signs of functional compromise  Specimen has been sent for review by Dermatopathology  Standard post-procedure care has been explained and has been included in written form within the patient's copy of Informed Consent  INFORMED CONSENT DISCUSSION AND POST-OPERATIVE INSTRUCTIONS FOR PATIENT    I   RATIONALE FOR PROCEDURE  I understand that a skin biopsy allows the Dermatologist to test a lesion or rash under the microscope to obtain a diagnosis  It usually involves numbing the area with numbing medication and removing a small piece of skin; sometimes the area will be closed with sutures  In this specific procedure, sutures are not usually needed  If any sutures are placed, then they are usually need to be removed in 2 weeks or less  I understand that my Dermatologist recommends that a skin \"shave\" biopsy be performed today  A local anesthetic, similar to the kind that a dentist uses when filling a cavity, will be injected with a very small needle into the skin area to be sampled  The injected skin and tissue underneath \"will go to sleep” and become numb so no pain should be felt afterwards  An instrument shaped like a tiny \"razor blade\" (shave biopsy instrument) will be used to cut a small piece of tissue and skin from the area so that a sample of tissue can be taken and examined more closely under the microscope  A slight amount of bleeding will occur, but it will be stopped with direct pressure and a pressure bandage and any other appropriate methods  I understands that a scar will form where the wound was created  Surgical ointment will be applied to help protect the wound  Sutures are not usually needed      II   RISKS AND POTENTIAL COMPLICATIONS   I understand the risks and potential " "complications of a skin biopsy include but are not limited to the following:  • Bleeding  • Infection  • Pain  • Scar/keloid  • Skin discoloration  • Incomplete Removal  • Recurrence  • Nerve Damage/Numbness/Loss of Function  • Allergic Reaction to Anesthesia  • Biopsies are diagnostic procedures and based on findings additional treatment or evaluation may be required  • Loss or destruction of specimen resulting in no additional findings    My Dermatologist has explained to me the nature of the condition, the nature of the procedure, and the benefits to be reasonably expected compared with alternative approaches  My Dermatologist has discussed the likelihood of major risks or complications of this procedure including the specific risks listed above, such as bleeding, infection, and scarring/keloid  I understand that a scar is expected after this procedure  I understand that my physician cannot predict if the scar will form a \"keloid,\" which extends beyond the borders of the wound that is created  A keloid is a thick, painful, and bumpy scar  A keloid can be difficult to treat, as it does not always respond well to therapy, which includes injecting cortisone directly into the keloid every few weeks  While this usually reduces the pain and size of the scar, it does not eliminate it  I understand that photographs may be taken before and after the procedure  These will be maintained as part of the medical providers confidential records and may not be made available to me  I further authorize the medical provider to use the photographs for teaching purposes or to illustrate scientific papers, books, or lectures if in his/her judgment, medical research, education, or science may benefit from its use  I have had an opportunity to fully inquire about the risks and benefits of this procedure and its alternatives     I have been given ample time and opportunity to ask questions and to seek a second opinion if I " "wished to do so  I acknowledge that there have specifically been no guarantees as to the cosmetic results from the procedure  I am aware that with any procedure there is always the possibility of an unexpected complication  III  POST-PROCEDURAL CARE (WHAT YOU WILL NEED TO DO \"AFTER THE BIOPSY\" TO OPTIMIZE HEALING)    • Keep the area clean and dry  Try NOT to remove the bandage or get it wet for the first 24 hours  • Gently clean the area and apply surgical ointment (such as Vaseline petrolatum ointment, which is available \"over the counter\" and not a prescription) to the biopsy site for up to 2 weeks straight  This acts to protect the wound from the outside world  • Sutures are not usually placed in this procedure  If any sutures were placed, return for suture removal as instructed (generally 1 week for the face, 2 weeks for the body)  • Take Acetaminophen (Tylenol) for discomfort, if no contraindications  Ibuprofen or aspirin could make bleeding worse  • Call our office immediately for signs of infection: fever, chills, increased redness, warmth, tenderness, discomfort/pain, or pus or foul smell coming from the wound  WHAT TO DO IF THERE IS ANY BLEEDING? If a small amount of bleeding is noticed, place a clean cloth over the area and apply firm pressure for ten minutes  Check the wound after 10 minutes of direct pressure  If bleeding persists, try one more time for an additional 10 minutes of direct pressure on the area  If the bleeding becomes heavier or does not stop after the second attempt, or if you have any other questions about this procedure, then please call your [facility]'s Dermatologist by calling [phone] (SKIN)  I hereby acknowledge that I have reviewed and verified the site with my Dermatologist and have requested and authorized my Dermatologist to proceed with the procedure  Mohs discussed with patient      Scribe Attestation    I,:  Caleb Fregoso am acting as a " scribe while in the presence of the attending physician :       I,:  Deborah Miles MD personally performed the services described in this documentation    as scribed in my presence :

## 2023-04-26 NOTE — PATIENT INSTRUCTIONS
"  Physical Exam and Assessment/Plan by Diagnosis:    NEOPLASM OF UNCERTAIN BEHAVIOR OF SKIN      Assessment and Plan:  I have discussed with the patient that a sample of skin via a \"skin biopsy” would be potentially helpful to further make a specific diagnosis under the microscope  Based on a thorough discussion of this condition and the management approach to it (including a comprehensive discussion of the known risks, side effects and potential benefits of treatment), the patient (family) agrees to implement the following specific plan:    Procedure:  Skin Biopsy  After a thorough discussion of treatment options and risk/benefits/alternatives (including but not limited to local pain, scarring, dyspigmentation, blistering, possible superinfection, and inability to confirm a diagnosis via histopathology), verbal and written consent were obtained and portion of the rash was biopsied for tissue sample  See below for consent that was obtained from patient and subsequent Procedure Note  PROCEDURE TANGENTIAL (SHAVE) BIOPSY NOTE:    After obtaining informed consent  at which time there was a discussion about the purpose of biopsy  and low risks of infection and bleeding  The area was prepped and draped in the usual fashion  Anesthesia was obtained with 1% lidocaine with epinephrine  A shave biopsy to an appropriate sampling depth was obtained by Shave (Dermablade or 15 blade) The resulting wound was covered with surgical ointment and bandaged appropriately  The patient tolerated the procedure well without complications and was without signs of functional compromise  Specimen has been sent for review by Dermatopathology  Standard post-procedure care has been explained and has been included in written form within the patient's copy of Informed Consent      INFORMED CONSENT DISCUSSION AND POST-OPERATIVE INSTRUCTIONS FOR PATIENT    I   RATIONALE FOR PROCEDURE  I understand that a skin biopsy allows the " "Dermatologist to test a lesion or rash under the microscope to obtain a diagnosis  It usually involves numbing the area with numbing medication and removing a small piece of skin; sometimes the area will be closed with sutures  In this specific procedure, sutures are not usually needed  If any sutures are placed, then they are usually need to be removed in 2 weeks or less  I understand that my Dermatologist recommends that a skin \"shave\" biopsy be performed today  A local anesthetic, similar to the kind that a dentist uses when filling a cavity, will be injected with a very small needle into the skin area to be sampled  The injected skin and tissue underneath \"will go to sleep” and become numb so no pain should be felt afterwards  An instrument shaped like a tiny \"razor blade\" (shave biopsy instrument) will be used to cut a small piece of tissue and skin from the area so that a sample of tissue can be taken and examined more closely under the microscope  A slight amount of bleeding will occur, but it will be stopped with direct pressure and a pressure bandage and any other appropriate methods  I understands that a scar will form where the wound was created  Surgical ointment will be applied to help protect the wound  Sutures are not usually needed      II   RISKS AND POTENTIAL COMPLICATIONS   I understand the risks and potential complications of a skin biopsy include but are not limited to the following:  Bleeding  Infection  Pain  Scar/keloid  Skin discoloration  Incomplete Removal  Recurrence  Nerve Damage/Numbness/Loss of Function  Allergic Reaction to Anesthesia  Biopsies are diagnostic procedures and based on findings additional treatment or evaluation may be required  Loss or destruction of specimen resulting in no additional findings    My Dermatologist has explained to me the nature of the condition, the nature of the procedure, and the benefits to be reasonably expected compared with alternative " "approaches  My Dermatologist has discussed the likelihood of major risks or complications of this procedure including the specific risks listed above, such as bleeding, infection, and scarring/keloid  I understand that a scar is expected after this procedure  I understand that my physician cannot predict if the scar will form a \"keloid,\" which extends beyond the borders of the wound that is created  A keloid is a thick, painful, and bumpy scar  A keloid can be difficult to treat, as it does not always respond well to therapy, which includes injecting cortisone directly into the keloid every few weeks  While this usually reduces the pain and size of the scar, it does not eliminate it  I understand that photographs may be taken before and after the procedure  These will be maintained as part of the medical providers confidential records and may not be made available to me  I further authorize the medical provider to use the photographs for teaching purposes or to illustrate scientific papers, books, or lectures if in his/her judgment, medical research, education, or science may benefit from its use  I have had an opportunity to fully inquire about the risks and benefits of this procedure and its alternatives  I have been given ample time and opportunity to ask questions and to seek a second opinion if I wished to do so  I acknowledge that there have specifically been no guarantees as to the cosmetic results from the procedure  I am aware that with any procedure there is always the possibility of an unexpected complication  III  POST-PROCEDURAL CARE (WHAT YOU WILL NEED TO DO \"AFTER THE BIOPSY\" TO OPTIMIZE HEALING)    Keep the area clean and dry  Try NOT to remove the bandage or get it wet for the first 24 hours      Gently clean the area and apply surgical ointment (such as Vaseline petrolatum ointment, which is available \"over the counter\" and not a prescription) to the biopsy site for up to 2 " weeks straight  This acts to protect the wound from the outside world  Sutures are not usually placed in this procedure  If any sutures were placed, return for suture removal as instructed (generally 1 week for the face, 2 weeks for the body)  Take Acetaminophen (Tylenol) for discomfort, if no contraindications  Ibuprofen or aspirin could make bleeding worse  Call our office immediately for signs of infection: fever, chills, increased redness, warmth, tenderness, discomfort/pain, or pus or foul smell coming from the wound  WHAT TO DO IF THERE IS ANY BLEEDING? If a small amount of bleeding is noticed, place a clean cloth over the area and apply firm pressure for ten minutes  Check the wound after 10 minutes of direct pressure  If bleeding persists, try one more time for an additional 10 minutes of direct pressure on the area  If the bleeding becomes heavier or does not stop after the second attempt, or if you have any other questions about this procedure, then please call your [facility]'s Dermatologist by calling [phone] (SKIN)  I hereby acknowledge that I have reviewed and verified the site with my Dermatologist and have requested and authorized my Dermatologist to proceed with the procedure

## 2023-05-03 ENCOUNTER — TELEPHONE (OUTPATIENT)
Dept: MULTI SPECIALTY CLINIC | Facility: CLINIC | Age: 58
End: 2023-05-03

## 2023-05-03 NOTE — TELEPHONE ENCOUNTER
Tried calling patient to discuss biopsy results  No answer  Left VM stating that I would try calling back later  ----- Message from Ximena Moore MD sent at 5/1/2023  4:45 PM EDT -----  DERMATOPATHOLOGY RESULT NOTE    Results reviewed by ordering physician  Please notify patient of result and recommendation  Instructions for Clinical Derm Team:   (remember to route Result Note to appropriate staff): Mohs    Result & Plan by Specimen:    Specimen A: malignant  Plan: Mohs (order entered)    Case Report  Surgical Pathology Report                         Case: [id number]                                   Authorizing Provider: Ximena Moore MD         Collected:           04/26/2023 1655              Ordering Location:     [facility]      Received:            04/26/2023 1655                                   [facility]                                                                Pathologist:           Vanda Reardon MD                                                           Specimen:    Skin, Other, A: Right cheek                                                              Final Diagnosis  A  Skin, right cheek, shave biopsy:     BASAL CELL CARCINOMA (NODULAR TYPE); transected         Electronically signed by Vanda Reardon MD on 5/1/2023 at  4:02 PM  Additional Information   All reported additional testing was performed with appropriately reactive controls   These tests were developed and their performance characteristics determined by [facility] or appropriate performing facility, though some tests may be performed on tissues which have not been validated for performance characteristics (such as staining performed on alcohol exposed cell blocks and decalcified tissues)   Results should be interpreted with caution and in the context of the patients' clinical condition  These tests may not be cleared or approved by the [place]   Food and Drug Administration, though "the FDA has determined that such clearance or approval is not necessary  These tests are used for clinical purposes and they should not be regarded as investigational or for research  This laboratory has been approved by CLIA 88, designated as a high-complexity laboratory and is qualified to perform these tests  Mirella Whitehead Description   A  The specimen is received in formalin, labeled with the patient's name and hospital number, and is designated \" right cheek\"  The specimen consists of a tan superficial shaving of skin measuring 0 7 x 0 4 x 0 1 cm  The skin surface appears keratotic  The margin of resection is inked green  The skin surface is inked red  The specimen is bisected  Entirely submitted  One cassette    Between sponges      Note: The estimated total formalin fixation time based upon information provided by the submitting clinician and the standard processing schedule is under 72 hours      [facility]  Clinical Information   Specimen A: Right cheek; skin; shave biopsy; 1 3 cm x 1 cm ulcerated plaque, rodent ulcer, x years; Clinical dx: BCC     ATTN: Hang 24 SX 51 LAB          Specimen Collected: 04/26/23  4:55 PM Last Resulted: 05/01/23  4:02 PM              "

## 2023-05-05 ENCOUNTER — TRANSCRIBE ORDERS (OUTPATIENT)
Dept: LAB | Facility: CLINIC | Age: 58
End: 2023-05-05

## 2023-05-05 ENCOUNTER — APPOINTMENT (OUTPATIENT)
Dept: LAB | Facility: CLINIC | Age: 58
End: 2023-05-05

## 2023-05-05 DIAGNOSIS — E78.2 MIXED HYPERLIPIDEMIA: ICD-10-CM

## 2023-05-05 DIAGNOSIS — F10.982 ALCOHOL-INDUCED SLEEP DISORDER (HCC): ICD-10-CM

## 2023-05-05 DIAGNOSIS — I10 BENIGN HYPERTENSION: ICD-10-CM

## 2023-05-05 DIAGNOSIS — F41.9 ANXIETY: ICD-10-CM

## 2023-05-05 DIAGNOSIS — G25.81 RESTLESS LEGS: ICD-10-CM

## 2023-05-05 DIAGNOSIS — G25.81 RESTLESS LEGS: Primary | ICD-10-CM

## 2023-05-05 LAB
ALBUMIN SERPL BCP-MCNC: 3.7 G/DL (ref 3.5–5)
ALP SERPL-CCNC: 92 U/L (ref 46–116)
ALT SERPL W P-5'-P-CCNC: 39 U/L (ref 12–78)
ANION GAP SERPL CALCULATED.3IONS-SCNC: 0 MMOL/L (ref 4–13)
AST SERPL W P-5'-P-CCNC: 19 U/L (ref 5–45)
BASOPHILS # BLD AUTO: 0.09 THOUSANDS/ÂΜL (ref 0–0.1)
BASOPHILS NFR BLD AUTO: 1 % (ref 0–1)
BILIRUB SERPL-MCNC: 0.31 MG/DL (ref 0.2–1)
BUN SERPL-MCNC: 19 MG/DL (ref 5–25)
CALCIUM SERPL-MCNC: 9.3 MG/DL (ref 8.3–10.1)
CHLORIDE SERPL-SCNC: 109 MMOL/L (ref 96–108)
CHOLEST SERPL-MCNC: 187 MG/DL
CK SERPL-CCNC: 179 U/L (ref 39–308)
CO2 SERPL-SCNC: 28 MMOL/L (ref 21–32)
CREAT SERPL-MCNC: 0.91 MG/DL (ref 0.6–1.3)
EOSINOPHIL # BLD AUTO: 0.38 THOUSAND/ÂΜL (ref 0–0.61)
EOSINOPHIL NFR BLD AUTO: 4 % (ref 0–6)
ERYTHROCYTE [DISTWIDTH] IN BLOOD BY AUTOMATED COUNT: 13.2 % (ref 11.6–15.1)
FERRITIN SERPL-MCNC: 89 NG/ML (ref 8–388)
FOLATE SERPL-MCNC: 13.4 NG/ML (ref 3.1–17.5)
GFR SERPL CREATININE-BSD FRML MDRD: 93 ML/MIN/1.73SQ M
GLUCOSE P FAST SERPL-MCNC: 100 MG/DL (ref 65–99)
HCT VFR BLD AUTO: 42.5 % (ref 36.5–49.3)
HDLC SERPL-MCNC: 46 MG/DL
HGB BLD-MCNC: 14.7 G/DL (ref 12–17)
IMM GRANULOCYTES # BLD AUTO: 0.04 THOUSAND/UL (ref 0–0.2)
IMM GRANULOCYTES NFR BLD AUTO: 0 % (ref 0–2)
IRON SATN MFR SERPL: 21 % (ref 20–50)
IRON SERPL-MCNC: 54 UG/DL (ref 65–175)
LDLC SERPL CALC-MCNC: 124 MG/DL (ref 0–100)
LYMPHOCYTES # BLD AUTO: 2.81 THOUSANDS/ÂΜL (ref 0.6–4.47)
LYMPHOCYTES NFR BLD AUTO: 27 % (ref 14–44)
MCH RBC QN AUTO: 30.6 PG (ref 26.8–34.3)
MCHC RBC AUTO-ENTMCNC: 34.6 G/DL (ref 31.4–37.4)
MCV RBC AUTO: 88 FL (ref 82–98)
MONOCYTES # BLD AUTO: 0.87 THOUSAND/ÂΜL (ref 0.17–1.22)
MONOCYTES NFR BLD AUTO: 8 % (ref 4–12)
NEUTROPHILS # BLD AUTO: 6.2 THOUSANDS/ÂΜL (ref 1.85–7.62)
NEUTS SEG NFR BLD AUTO: 60 % (ref 43–75)
NONHDLC SERPL-MCNC: 141 MG/DL
NRBC BLD AUTO-RTO: 0 /100 WBCS
PLATELET # BLD AUTO: 314 THOUSANDS/UL (ref 149–390)
PMV BLD AUTO: 9.4 FL (ref 8.9–12.7)
POTASSIUM SERPL-SCNC: 4.1 MMOL/L (ref 3.5–5.3)
PROT SERPL-MCNC: 6.8 G/DL (ref 6.4–8.4)
RBC # BLD AUTO: 4.81 MILLION/UL (ref 3.88–5.62)
SODIUM SERPL-SCNC: 137 MMOL/L (ref 135–147)
T4 FREE SERPL-MCNC: 1.02 NG/DL (ref 0.76–1.46)
TIBC SERPL-MCNC: 257 UG/DL (ref 250–450)
TRIGL SERPL-MCNC: 86 MG/DL
TSH SERPL DL<=0.05 MIU/L-ACNC: 1.97 UIU/ML (ref 0.45–4.5)
VIT B12 SERPL-MCNC: 687 PG/ML (ref 100–900)
WBC # BLD AUTO: 10.39 THOUSAND/UL (ref 4.31–10.16)

## 2023-05-09 LAB
VIT B1 BLD-SCNC: 184 NMOL/L (ref 66.5–200)
VIT B6 SERPL-MCNC: 5.8 UG/L (ref 3.4–65.2)

## 2023-05-15 ENCOUNTER — TELEPHONE (OUTPATIENT)
Dept: DERMATOLOGY | Facility: CLINIC | Age: 58
End: 2023-05-15

## 2023-05-15 ENCOUNTER — TELEPHONE (OUTPATIENT)
Dept: SLEEP CENTER | Facility: CLINIC | Age: 58
End: 2023-05-15

## 2023-05-15 NOTE — LETTER
Job Snell     1965    [address]    Dear Job Snell,    You are scheduled to have the MOHS procedure on August 7, 2023 at 10:00 am for right cheek with Tesha Keenan  Our office is located in [facility] our address is [address] [place] [address], [address] [place] [address]  Once you arrive please check in with our front staff in [address] and they will escort you to the [place] [id number] waiting room  If you have someone bringing you to your appointment they may wait in the waiting room or accompany you in your visit  Below you will find some pre-op instructions along with some information regarding the MOHS procedure  If you have any questions please call our office at [phone]  Thank you,    [facility] MOHS Department         PRE-OPERATIVE INSTRUCTIONS - MOHS    Before your scheduled surgery, there are a number of important precautions and positive steps you should take to help prepare yourself for a successful treatment and speedy recovery  Some of the steps, which are listed below, may seem unnecessary and inconvenient, but they are important  For example, when you stop smoking, you increase your ability to heal  Occasionally, there may be valid reasons, personal or medical, why you can't comply  In such cases, please call the office so we can discuss possible ways to overcome any obstacles you may be encountering  If you have any questions about the surgery, or remember additional medical information that you forgot to mention to our staff, please contact the office prior to your surgery  GENERAL INFORMATION REGARDING MOHS MICROGRAPHIC SURGERY    Mohs surgery is a specialized technique for the removal of skin cancer developed by Dr Johnnie Dalton Mohs over 50 years ago to improve the cure rates of skin cancer   Traditionally, skin cancers are treated by destructive methods (radiation, freezing, scraping, and burning) or excision (cutting out the tissue with standards margins and sending it to an outside laboratory for testing)  These methods all yield cure rates between 65%-94%  However, for cancers located in cosmetically sensitive areas, large tumors, or tumors unsuccessfully treated by other means, Mohs surgery offers a higher cure rate  In most cases, Mohs surgery provides you with a 99% cure rate for primary (previously untreated) basal cell cancer and a 95% cure rate for primary squamous cell cancer  In Mohs surgery, tissue is removed and processed in a way that we are able to check 100% of the margins, giving the highest cure rate for any method of treating skin cancers while providing maximal preservation of normal skin  This allows the surgeon to produce an optimal cosmetic result for the patient by maximizing the amount of tissue removed yielding as small a scar as possible    On the day of surgery, you will be given local anesthesia only (similar to what was given to you during your initial biopsy)  You will remain awake  You will verify the location of the skin cancer prior to the onset of the surgery  Once the area is numb, the tissue containing the skin cancer will be removed, taking a small safety margin  This margin is usually smaller than what would be taken with a standard excision  Once the tissue is removed, it is marked and oriented  The first layer (“Stage I”) will be processed in our laboratory  The wound will be treated for bleeding and a bandage will be placed to keep you comfortable while you wait an approximate 45 minutes-1 hour (for the processing of the tissue) in your room  Your Mohs surgeon will examine the pathology in the lab, checking all the margins  If any tumor remains, you will need to take a second layer of skin (“Stage 2”)  The area will be re-anesthetized and your Mohs surgeon will remove more skin only in the area where the tumor exists   This process will continue until all the skin cancer is removed  Unfortunately, there is no method to predict how many layers or stages will be taken  Once the tumor has been removed completely, we will discuss the best ways to close the defect  Most wounds may be closed with stitches  A larger wound may require a skin graft or a flap  In rare instances, especially for cancers around the eye or for larger cancers, we may work with another surgeon (oculoplastic, ENT, plastics) with special skills to assist with reconstruction  Medications: Please take all your normal medications the morning of your surgery  If you are a diabetic, please bring your insulin or medications with you, as well as a snack to avoid having low blood sugar during your day with us  1)  Blood Thinners    - Ask the doctor (that prescribed the medication) if prior to surgery you should stop your prescribed blood thinners, such as Coumadin/Warfarin, Plavix, Eliquis, Pradaxa, Brilinta, Apixaban, Xarelto, Lovonox, Rivaroxaban, or Aggrenox  NEVER stop them without your doctor's permission or knowledge  If you have had a stroke, heart attack, or have an irregular heartbeat, your doctor may want you to continue your medication  We can still do your surgery  You may have more bruising     - VERY IMPORTANT: If you take aspirin because you have had a stroke, heart attack, heart disease, other condition, or your physician has prescribed you to take it, please continue your aspirin     - Most people should stop all non-steroidal anti-inflammatory medications (Motrin, Naproxen, Advil, Midol, Aleve, etc ) for 7 days prior to your scheduled surgery and 2 days after (unless instructed otherwise after surgery)  You may take Tylenol for pain  2) Antibiotics  - If you usually require antibiotics prior to dental work, please let the office know at least 24 hours prior to your surgery   Medical conditions that sometimes require preoperative antibiotics include artificial heart valves, heart murmurs, artificial joints, and related problems  We will give you a different medication than the dentist, so please contact us for the correct antibiotic   - If you were prescribed pre-operative antibiotics by our office, please take the medication 2 hours prior to your procedure  3) Vitamins and Supplements  - Avoid taking any supplements with Vitamin E, Fish Oil, Gingko, Ginseng, and Garlic for 2 weeks before and 2 days after your surgery  These thin your blood    Alcohol: Avoid drinking alcohol for 2 days prior to your surgery, and for 2 days afterwards (it thins the blood and causes more bruising and swelling)  Smoking: Try to STOP or reduce smoking significantly the week before your surgery, and especially the week afterwards (it greatly improves how well you heal)  Tobacco smoke deprives the blood of oxygen, which is urgently needed by the wound during the healing process  Contact Lenses: Do not wear them on the day of the surgery  Instead, wear glasses and bring your case, in case we need to remove them  Clothing: Do not wear your nicest clothing on your surgery day  We recommend wearing a button down shirt that will not disrupt your post-operative dressing when changing later that night  Bathing: On the morning of your surgery, you may bathe or shower normally  If you get your hair done on a weekly basis, remember to get your hair washed the day before surgery    - You will need to keep your surgical site dry for a minimum of 48 hours after surgery  Makeup: If your surgery is on the face, please do not wear any makeup on the day of the surgery  Jewelry: Please try to avoid wearing jewelry on the day of surgery  Food: On the morning of surgery, have breakfast but limit your intake of caffeinated beverages  They are diuretic and may inconvenience you during surgery   If you are following up with another surgeon the same day as your Mohs surgery, you must receive permission to eat breakfast from that surgeon  What to bring with you on the day of your surgery:  ? Bring snacks - Since you could be at the office long, you may bring snacks and/or lunch with you  Some snacks and drinks are available at the office as well    ? Bring a sweater - Bring a sweater or jacket that buttons or zips down the front and will not disturb your wound dressing during removal   ? Bring something to do - You will be spending much of the day in our office  There will be 45-60 minute waiting periods  between layers/stages, and while there is a television with cable in every room, it is nice to have something to keep you occupied such as books, magazines, knitting, music, or work  Planning Ahead:  ? Other Appointments - It is important to realize that no matter how small the skin cancer appears to be, looks can be deceiving  Since your surgery may last the entire day, you should not schedule any other appointments that day  ? Special Occasions - Surgery often creates swelling and bruising  Also, the post-op dressing may be rather large and obvious  Keep this in mind as you arrange your social/work schedule  If an important event is already planned, please check with your referring physician or your Mohs surgeon to see if the surgery can be postponed  ? Activity Limits after Surgery - If surgery was performed on your face, we recommend that you keep your activity level to a minimum for 2-3 days (the blood pressure elevation related to exercise can lead to bleeding)  If you have stitches in an area that will be under tension or significant movement (neck, back, arms, legs), you will need to avoid heavy lifting (anything over 5 lbs) or exercise for at least 2 weeks and possibly longer  We also advise that you limit out of town travel for the first 7 days after surgery  You should also wait at least 7 days before going into a pool or the ocean  ?  Housework - Since you will need to minimize activity after surgery, plan to do your groceries, laundry, gardening, and other heavy household chores prior to your surgery  Please make arrangements for assistance during the post-op period  If surgery is around your mouth area, you may need to eat soft foods, such as soup, milkshakes, or yogurt for 48 hours  Purchasing bandage supplies: Prior to surgery, please purchase the following items to care for your surgical wound properly   - Cotton swabs (Q-tips)  - Vaseline or Aquaphor  - Telfa pads (or any non-stick dressing)  - Paper tape or Hypafix tape  - Gauze pads (3x3)      We will provide you with some bandage supplies after surgery to get you started  Transportation: It is often reassuring and comforting to have a  drive you to and from the surgery  He or she is welcome to wait in the office during the surgery  Please note that for safety reasons, only the patient is allowed in the procedure room during surgery  Thank you for your cooperation  Rescheduling: If you need to reschedule your surgery, please notify the office as soon as possible

## 2023-05-15 NOTE — TELEPHONE ENCOUNTER
----- Message from Francy Head MD sent at 5/14/2023  7:36 AM EDT -----  approved  ----- Message -----  From: Fatoumata Armendariz  Sent: 5/5/2023   9:39 AM EDT  To: Sleep Medicine [place] Provider    This Diagnostic sleep study needs approval      If approved please sign and return to clerical pool  If denied please include reasons why  Also provide alternative testing if warranted  Please sign and return to clerical pool

## 2023-05-15 NOTE — TELEPHONE ENCOUNTER
Juany Sanchez Pulling a message to call back to schedule his MOHS procedure- left mohs phone number,

## 2023-05-17 NOTE — TELEPHONE ENCOUNTER
Patient left message on voicemail for return call in regards to scheduling Mohs sx  Will need return call

## 2023-05-22 NOTE — TELEPHONE ENCOUNTER
Pre- operative Mohs Telephone Scheduling Note    Do you have a pacemaker, defibrillator, spinal or brain stimulator? no    Do you take antibiotics before skin or dental procedures? no  If yes, will likely require pre-operative antibiotics  Ask  the patient why they take the antibiotics (usually because of joint replacement)  Do you have a history of a joint replacements within the past 2 years? no   If yes, will likely require pre-operative antibiotics  Ask if orthopaedic surgeon has prescribed pre-operative antibiotics to take before procedures/dental work? Do you take any OTC medications that thin your blood (Aspirin, Aleve, Ibuprofen) or supplements that thin your blood (fish oil, garlic, vitamin E, Ginko Biloba)? no    Do you take any prescribed medications that thin your blood (Coumadin, Plavix, Xarelto, Eliquis or another prescribed blood thinner)? no    Do you have an allergy to lidocaine or epinephrine? no    Do you have allergies to Iodine? no    Do you wear a lidocaine patch? no    Have you ever been diagnosed with HIV, AIDS, Hep B and Hep C? no    Do you use a cane, walker or wheelchair? no    Is the patient from a nursing home? no If yes, Is there any special accommodations that is needed for patient n/a    Do you smoke? no      If yes,  patient to try and stop 2 days before surgery and 7 days after the surgery  Minimizing smoking as much as possible during this time will improve healing and the cosmetic result after surgery  Do you use supplemental oxygen? If so, how many liters and can you be off it for a short period of time? n/a    Date scheduled: August 7, 2023 @ 10:00 am with Dr Rocky Mina of Care with other provider ([place] [address], ENT) required? no   IF YES, PLEASE FORWARD TO APPROPRIATE PERSONNEL TO HELP COORDINATE  Are there remaining tumors to be scheduled? no    Was Prior Authorization obtained?  Yes on excel sheet (please use  [email or web address] to document prior auth)

## 2023-06-24 ENCOUNTER — HOSPITAL ENCOUNTER (EMERGENCY)
Facility: HOSPITAL | Age: 58
Discharge: HOME | End: 2023-06-24
Attending: EMERGENCY MEDICINE | Admitting: EMERGENCY MEDICINE
Payer: MEDICARE

## 2023-06-24 ENCOUNTER — APPOINTMENT (EMERGENCY)
Dept: RADIOLOGY | Facility: HOSPITAL | Age: 58
End: 2023-06-24
Payer: MEDICARE

## 2023-06-24 VITALS
OXYGEN SATURATION: 95 % | HEART RATE: 75 BPM | TEMPERATURE: 97.1 F | WEIGHT: 245 LBS | RESPIRATION RATE: 18 BRPM | SYSTOLIC BLOOD PRESSURE: 144 MMHG | DIASTOLIC BLOOD PRESSURE: 83 MMHG | BODY MASS INDEX: 34.17 KG/M2

## 2023-06-24 DIAGNOSIS — R10.9 ABDOMINAL PAIN: ICD-10-CM

## 2023-06-24 DIAGNOSIS — G43.909 MIGRAINE HEADACHE: Primary | ICD-10-CM

## 2023-06-24 LAB
ALBUMIN SERPL BCP-MCNC: 3.5 G/DL (ref 3.5–5)
ALP SERPL-CCNC: 101 U/L (ref 46–116)
ALT SERPL W P-5'-P-CCNC: 29 U/L (ref 12–78)
ANION GAP SERPL CALCULATED.3IONS-SCNC: 2 MMOL/L
AST SERPL W P-5'-P-CCNC: 17 U/L (ref 5–45)
BASOPHILS # BLD AUTO: 0.09 THOUSANDS/ÂΜL (ref 0–0.1)
BASOPHILS NFR BLD AUTO: 1 % (ref 0–1)
BILIRUB SERPL-MCNC: 0.3 MG/DL (ref 0.2–1)
BUN SERPL-MCNC: 16 MG/DL (ref 5–25)
CALCIUM SERPL-MCNC: 9.2 MG/DL (ref 8.3–10.1)
CARDIAC TROPONIN I PNL SERPL HS: 3 NG/L
CHLORIDE SERPL-SCNC: 112 MMOL/L (ref 96–108)
CO2 SERPL-SCNC: 24 MMOL/L (ref 21–32)
CREAT SERPL-MCNC: 0.98 MG/DL (ref 0.6–1.3)
EOSINOPHIL # BLD AUTO: 0.29 THOUSAND/ÂΜL (ref 0–0.61)
EOSINOPHIL NFR BLD AUTO: 4 % (ref 0–6)
ERYTHROCYTE [DISTWIDTH] IN BLOOD BY AUTOMATED COUNT: 13.4 % (ref 11.6–15.1)
GFR SERPL CREATININE-BSD FRML MDRD: 85 ML/MIN/1.73SQ M
GLUCOSE SERPL-MCNC: 98 MG/DL (ref 65–140)
HCT VFR BLD AUTO: 43 % (ref 36.5–49.3)
HGB BLD-MCNC: 14.6 G/DL (ref 12–17)
IMM GRANULOCYTES # BLD AUTO: 0.03 THOUSAND/UL (ref 0–0.2)
IMM GRANULOCYTES NFR BLD AUTO: 0 % (ref 0–2)
LIPASE SERPL-CCNC: 154 U/L (ref 73–393)
LYMPHOCYTES # BLD AUTO: 2.22 THOUSANDS/ÂΜL (ref 0.6–4.47)
LYMPHOCYTES NFR BLD AUTO: 27 % (ref 14–44)
MCH RBC QN AUTO: 29.6 PG (ref 26.8–34.3)
MCHC RBC AUTO-ENTMCNC: 34 G/DL (ref 31.4–37.4)
MCV RBC AUTO: 87 FL (ref 82–98)
MONOCYTES # BLD AUTO: 0.72 THOUSAND/ÂΜL (ref 0.17–1.22)
MONOCYTES NFR BLD AUTO: 9 % (ref 4–12)
NEUTROPHILS # BLD AUTO: 5.04 THOUSANDS/ÂΜL (ref 1.85–7.62)
NEUTS SEG NFR BLD AUTO: 59 % (ref 43–75)
NRBC BLD AUTO-RTO: 0 /100 WBCS
PLATELET # BLD AUTO: 311 THOUSANDS/UL (ref 149–390)
PMV BLD AUTO: 9 FL (ref 8.9–12.7)
POTASSIUM SERPL-SCNC: 4 MMOL/L (ref 3.5–5.3)
PROT SERPL-MCNC: 7 G/DL (ref 6.4–8.4)
RBC # BLD AUTO: 4.93 MILLION/UL (ref 3.88–5.62)
SODIUM SERPL-SCNC: 138 MMOL/L (ref 135–147)
WBC # BLD AUTO: 8.39 THOUSAND/UL (ref 4.31–10.16)

## 2023-06-24 PROCEDURE — 36415 COLL VENOUS BLD VENIPUNCTURE: CPT

## 2023-06-24 PROCEDURE — 99284 EMERGENCY DEPT VISIT MOD MDM: CPT

## 2023-06-24 PROCEDURE — 74177 CT ABD & PELVIS W/CONTRAST: CPT

## 2023-06-24 PROCEDURE — 70450 CT HEAD/BRAIN W/O DYE: CPT

## 2023-06-24 PROCEDURE — 84484 ASSAY OF TROPONIN QUANT: CPT | Performed by: EMERGENCY MEDICINE

## 2023-06-24 PROCEDURE — 96361 HYDRATE IV INFUSION ADD-ON: CPT

## 2023-06-24 PROCEDURE — 83690 ASSAY OF LIPASE: CPT | Performed by: EMERGENCY MEDICINE

## 2023-06-24 PROCEDURE — 93005 ELECTROCARDIOGRAM TRACING: CPT

## 2023-06-24 PROCEDURE — 96374 THER/PROPH/DIAG INJ IV PUSH: CPT

## 2023-06-24 PROCEDURE — 80053 COMPREHEN METABOLIC PANEL: CPT | Performed by: EMERGENCY MEDICINE

## 2023-06-24 PROCEDURE — 85025 COMPLETE CBC W/AUTO DIFF WBC: CPT | Performed by: EMERGENCY MEDICINE

## 2023-06-24 PROCEDURE — G1004 CDSM NDSC: HCPCS

## 2023-06-24 RX ORDER — FAMOTIDINE 20 MG/1
20 TABLET, FILM COATED ORAL 2 TIMES DAILY
Qty: 30 TABLET | Refills: 0 | Status: SHIPPED | OUTPATIENT
Start: 2023-06-24

## 2023-06-24 RX ORDER — KETOROLAC TROMETHAMINE 30 MG/ML
15 INJECTION, SOLUTION INTRAMUSCULAR; INTRAVENOUS ONCE
Status: COMPLETED | OUTPATIENT
Start: 2023-06-24 | End: 2023-06-24

## 2023-06-24 RX ADMIN — SODIUM CHLORIDE 1000 ML: 0.9 INJECTION, SOLUTION INTRAVENOUS at 13:19

## 2023-06-24 RX ADMIN — IOHEXOL 100 ML: 350 INJECTION, SOLUTION INTRAVENOUS at 13:39

## 2023-06-24 RX ADMIN — KETOROLAC TROMETHAMINE 15 MG: 30 INJECTION, SOLUTION INTRAMUSCULAR; INTRAVENOUS at 13:17
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
## 2023-06-24 NOTE — DISCHARGE INSTRUCTIONS
You were seen in the ED for abdominal pain and headaches  Return to the ED for any worsening symptoms or new symptoms  Follow up with your primary care doctor as soon as possible  Take pepcid as instructed

## 2023-06-25 LAB
ATRIAL RATE: 87 BPM
P AXIS: 61 DEGREES
PR INTERVAL: 146 MS
QRS AXIS: 18 DEGREES
QRSD INTERVAL: 98 MS
QT INTERVAL: 376 MS
QTC INTERVAL: 452 MS
T WAVE AXIS: 70 DEGREES
VENTRICULAR RATE: 87 BPM

## 2023-06-25 PROCEDURE — 93010 ELECTROCARDIOGRAM REPORT: CPT | Performed by: INTERNAL MEDICINE

## 2023-06-26 NOTE — ED ATTENDING ATTESTATION
6/24/2023  I, Michael Lofton MD, saw and evaluated the patient  I have discussed the patient with the resident/non-physician practitioner and agree with the resident's/non-physician practitioner's findings, Plan of Care, and MDM as documented in the resident's/non-physician practitioner's note, except where noted  All available labs and Radiology studies were reviewed  I was present for key portions of any procedure(s) performed by the resident/non-physician practitioner and I was immediately available to provide assistance  At this point I agree with the current assessment done in the Emergency Department  I have conducted an independent evaluation of this patient a history and physical is as follows:   Pt with history of TBI Pt has posterior pressure like ha for 3 weeks no trauma no weakness no numbness no double vision + blurred vision + nausea Pt also co upper abd pain for 3 weeks no fevers no change in stool PE: alert heart reg lungs clear abd soft  Tender upper abd ext and neuro nonfcoal mDM: will image rodolfo symptoms  ED Course         Critical Care Time  Procedures

## 2023-08-07 ENCOUNTER — PROCEDURE VISIT (OUTPATIENT)
Dept: DERMATOLOGY | Facility: CLINIC | Age: 58
End: 2023-08-07
Payer: MEDICARE

## 2023-08-07 VITALS
WEIGHT: 251.4 LBS | TEMPERATURE: 96.6 F | HEART RATE: 86 BPM | BODY MASS INDEX: 35.19 KG/M2 | SYSTOLIC BLOOD PRESSURE: 110 MMHG | HEIGHT: 71 IN | OXYGEN SATURATION: 96 % | DIASTOLIC BLOOD PRESSURE: 62 MMHG

## 2023-08-07 DIAGNOSIS — C44.319 BASAL CELL CARCINOMA OF RIGHT CHEEK: ICD-10-CM

## 2023-08-07 PROCEDURE — 17311 MOHS 1 STAGE H/N/HF/G: CPT | Performed by: DERMATOLOGY

## 2023-08-07 PROCEDURE — 17312 MOHS ADDL STAGE: CPT | Performed by: DERMATOLOGY

## 2023-08-07 PROCEDURE — 12052 INTMD RPR FACE/MM 2.6-5.0 CM: CPT | Performed by: DERMATOLOGY

## 2023-08-07 NOTE — PATIENT INSTRUCTIONS
[facility] Microscopic Surgery After Care    WOUND CARE AFTER SURGERY:    Do NOT to remove the pressure bandage for 48 hours. Keep the area clean and dry while this bandage is on. After removing the bandage for the first time, gently clean the area with soap and water. If the bandage is difficult to remove, getting the bandage wet in the shower will sometimes help soften the adhesive and allow it to be removed more easily. You will now need to cleanse this area daily in the shower with gentle soap. There is no need to scrub the area. Apply plain Vaseline ointment (this is over the counter and not a prescription) to the site followed by a clean appropriately sized bandage to area. Non stick dressing and paper tape (or Hypafix) are recommended for sensitive skin but a bandaid is fine if it covers the area well. You will need to continue the above daily wound care until you return for suture removal in 7 days (generally 7 days for the face, 10-14 days off the face)      RESTRICTIONS:     For two DAYS:   - You will need to take it very easy as this time is highest risk for bleeding. Being a "couch potato" during these two days is generally recommended. - For surgeries on the face/neck/scalp: Avoid leaning down to pick things up off the floor as this brings blood up to your head. Instead, squat down to pick things up. For two WEEKS:   - No heavy lifting (anything greater than 10 pounds)   - You can start to do slow, gentle activities such as slow walking but nothing to increase your heart rate and blood pressure too much (such as cardiovascular exercise). It is important to take it easy as there is still a risk for bleeding and a high risk popping of stitches open during this time. If we did surgery near the eyes (including the nose, forehead, front part of your scalp, cheeks): It is VERY common to get a large amount of swelling around your eyes (puffy eyes).  Although less frequent, this can be enough to swell your eyes shut and can also come along with bruising. This should not hurt and is very expected and normal. It is typically worst at ~ 3 days out from your surgery and dramatically better 1 week post-operatively. MANAGING YOUR PAIN AFTER SURGERY     You can expect to have some pain after surgery. This is normal. The pain is typically worse the first two days after surgery, and quickly begins to get better. The best strategy for controlling your pain after surgery is around the clock pain control. You can take over the counter Acetaminophen (Tylenol) for discomfort, if no contraindications. If you are taking this at the maximum dose, you can alternate this with Motrin (ibuprofen or Advil) as well. Alternating these medications with each other allows you to maximize your pain control. In addition to Tylenol and Motrin, you can use heating pads or ice packs on your incisions to help reduce your pain. How will I alternate your regular strength over-the-counter pain medication? You will take a dose of pain medication every three hours. Start by taking 650 mg of Tylenol (2 pills of 325 mg)   3 hours later take 600 mg of Motrin (3 pills of 200 mg)   3 hours after taking the Motrin take 650 mg of Tylenol   3 hours after that take 600 mg of Motrin. See example - if your first dose of Tylenol is at 12:00 PM     12:00 PM  Tylenol 650 mg (2 pills of 325 mg)    3:00 PM  Motrin 600 mg (3 pills of 200 mg)    6:00 PM  Tylenol 650 mg (2 pills of 325 mg)    9:00 PM  Motrin 600 mg (3 pills of 200 mg)    Continue alternating every 3 hours      Important:   Do not take more than 4000mg of Tylenol or 3200mg of Motrin in a 24-hour period. What if I still have pain? If you have pain that is not controlled with the over-the-counter pain medications (Tylenol and Motrin or Advil), don't hesitate to call our staff using the number provided.  We will help make sure you are managing your pain in the best way possible, and if necessary, we can provide a prescription for additional pain medication. CALL OUR OFFICE IMMEDIATELY FOR ANY SIGNS OF INFECTION:    This includes fever, chills, increased redness, warmth, tenderness, severe discomfort/pain, or pus or foul smell coming from the wound. If you are experiencing any of the above, please call [facility] directly at [phone] (SKIN)    IF BLEEDING IS NOTICED:    Place a clean cloth over the area and apply firm pressure for thirty minutes. Check the wound ONLY after 30 minutes of direct pressure; do not cheat and sneak a peak, as that does not count. If bleeding persists after 30 minutes of legitimate direct pressure, then try one more round of direct pressure to the area. Should the bleeding become heavier or not stop after the second attempt, call [facility] directly at [phone] (SKIN). Your call will get routed to the dermatology surgeon on call even after hours.

## 2023-08-07 NOTE — LETTER
August 7, 2023     Patient: Renetta Resendez  YOB: 1965  Date of Visit: 8/7/2023      To Whom it May Concern:    Renetta Resendez is under my professional care. Romelia Hoffmanniainshabnam was seen in my office on 8/7/2023. Romelia Leyva may return to work on August 10, 2023 . If you have any questions or concerns, please don't hesitate to call.          Sincerely,          Diana Walker MD
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
## 2023-08-14 ENCOUNTER — OFFICE VISIT (OUTPATIENT)
Dept: DERMATOLOGY | Facility: CLINIC | Age: 58
End: 2023-08-14

## 2023-08-14 DIAGNOSIS — Z48.02 ENCOUNTER FOR REMOVAL OF SUTURES: Primary | ICD-10-CM

## 2023-08-14 PROCEDURE — 99024 POSTOP FOLLOW-UP VISIT: CPT | Performed by: DERMATOLOGY

## 2023-08-14 NOTE — PROGRESS NOTES
Suture removal    Date/Time: 8/14/2023 1:15 PM    Performed by: Maryam Bueno RN  Authorized by: Vamshi Rico MD  Universal Protocol:  Consent: Verbal consent obtained. Written consent not obtained. Risks and benefits: risks, benefits and alternatives were discussed  Consent given by: patient  Time out: Immediately prior to procedure a "time out" was called to verify the correct patient, procedure, equipment, support staff and site/side marked as required. Timeout called at: 8/14/2023 1:20 PM.  Patient understanding: patient states understanding of the procedure being performed  Patient consent: the patient's understanding of the procedure matches consent given  Procedure consent: procedure consent matches procedure scheduled  Relevant documents: relevant documents present and verified  Test results: test results not available  Site marked: the operative site was not marked  Radiology Images displayed and confirmed. If images not available, report reviewed: imaging studies not available  Patient identity confirmed: verbally with patient        Patient location:  Clinic  Location:     Laterality:  Right    Location:  [address] [place] [address] location:  [address] [place] [address] location:  R cheek  Procedure details: Tools used:  Suture removal kit    Wound appearance:  No sign(s) of infection, good wound healing, clean, moist, nonpurulent, nontender and pink    Number of sutures removed:  9  Post-procedure details:     Post-procedure assessment: vaseline ointment applied. Patient tolerance of procedure: Tolerated well, no immediate complications  Comments:      Patient was encouraged to continue to clean and care for the wound until fully healed. Patient was encouraged to continue to follow up for regular full body skin exams as scheduled. Well healing scar, sutures removed.     Scribe Attestation    I,:  Maryam Bueno RN am acting as a scribe while in the presence of the attending physician.: I,:  Sd Jamil MD personally performed the services described in this documentation    as scribed in my presence.:

## 2023-10-09 PROBLEM — E66.01 OBESITY, MORBID (HCC): Status: ACTIVE | Noted: 2023-10-09

## 2023-11-01 ENCOUNTER — APPOINTMENT (OUTPATIENT)
Dept: LAB | Facility: CLINIC | Age: 58
End: 2023-11-01
Payer: MEDICARE

## 2023-11-01 DIAGNOSIS — R73.9 HYPERGLYCEMIA: ICD-10-CM

## 2023-11-01 DIAGNOSIS — E55.9 VITAMIN D DEFICIENCY: ICD-10-CM

## 2023-11-01 DIAGNOSIS — F41.9 ANXIETY: ICD-10-CM

## 2023-11-01 DIAGNOSIS — I10 BENIGN HYPERTENSION: ICD-10-CM

## 2023-11-01 DIAGNOSIS — R97.20 PSA ELEVATION: ICD-10-CM

## 2023-11-01 DIAGNOSIS — E78.2 MIXED HYPERLIPIDEMIA: ICD-10-CM

## 2023-11-01 LAB
25(OH)D3 SERPL-MCNC: 23.4 NG/ML (ref 30–100)
ALBUMIN SERPL BCP-MCNC: 4.4 G/DL (ref 3.5–5)
ALP SERPL-CCNC: 84 U/L (ref 34–104)
ALT SERPL W P-5'-P-CCNC: 21 U/L (ref 7–52)
ANION GAP SERPL CALCULATED.3IONS-SCNC: 10 MMOL/L
AST SERPL W P-5'-P-CCNC: 15 U/L (ref 13–39)
BASOPHILS # BLD AUTO: 0.1 THOUSANDS/ÂΜL (ref 0–0.1)
BASOPHILS NFR BLD AUTO: 1 % (ref 0–1)
BILIRUB SERPL-MCNC: 0.62 MG/DL (ref 0.2–1)
BUN SERPL-MCNC: 16 MG/DL (ref 5–25)
CALCIUM SERPL-MCNC: 9.7 MG/DL (ref 8.4–10.2)
CHLORIDE SERPL-SCNC: 103 MMOL/L (ref 96–108)
CHOLEST SERPL-MCNC: 208 MG/DL
CO2 SERPL-SCNC: 25 MMOL/L (ref 21–32)
CREAT SERPL-MCNC: 0.93 MG/DL (ref 0.6–1.3)
EOSINOPHIL # BLD AUTO: 0.29 THOUSAND/ÂΜL (ref 0–0.61)
EOSINOPHIL NFR BLD AUTO: 2 % (ref 0–6)
ERYTHROCYTE [DISTWIDTH] IN BLOOD BY AUTOMATED COUNT: 13.9 % (ref 11.6–15.1)
EST. AVERAGE GLUCOSE BLD GHB EST-MCNC: 111 MG/DL
GFR SERPL CREATININE-BSD FRML MDRD: 91 ML/MIN/1.73SQ M
GLUCOSE P FAST SERPL-MCNC: 106 MG/DL (ref 65–99)
HBA1C MFR BLD: 5.5 %
HCT VFR BLD AUTO: 46.1 % (ref 36.5–49.3)
HDLC SERPL-MCNC: 40 MG/DL
HGB BLD-MCNC: 16.1 G/DL (ref 12–17)
IMM GRANULOCYTES # BLD AUTO: 0.04 THOUSAND/UL (ref 0–0.2)
IMM GRANULOCYTES NFR BLD AUTO: 0 % (ref 0–2)
LDLC SERPL CALC-MCNC: 143 MG/DL (ref 0–100)
LYMPHOCYTES # BLD AUTO: 2.77 THOUSANDS/ÂΜL (ref 0.6–4.47)
LYMPHOCYTES NFR BLD AUTO: 23 % (ref 14–44)
MCH RBC QN AUTO: 30.7 PG (ref 26.8–34.3)
MCHC RBC AUTO-ENTMCNC: 34.9 G/DL (ref 31.4–37.4)
MCV RBC AUTO: 88 FL (ref 82–98)
MONOCYTES # BLD AUTO: 1.14 THOUSAND/ÂΜL (ref 0.17–1.22)
MONOCYTES NFR BLD AUTO: 10 % (ref 4–12)
NEUTROPHILS # BLD AUTO: 7.7 THOUSANDS/ÂΜL (ref 1.85–7.62)
NEUTS SEG NFR BLD AUTO: 64 % (ref 43–75)
NONHDLC SERPL-MCNC: 168 MG/DL
NRBC BLD AUTO-RTO: 0 /100 WBCS
PLATELET # BLD AUTO: 330 THOUSANDS/UL (ref 149–390)
PMV BLD AUTO: 10 FL (ref 8.9–12.7)
POTASSIUM SERPL-SCNC: 3.6 MMOL/L (ref 3.5–5.3)
PROT SERPL-MCNC: 7.2 G/DL (ref 6.4–8.4)
PSA SERPL-MCNC: 6.2 NG/ML (ref 0–4)
RBC # BLD AUTO: 5.25 MILLION/UL (ref 3.88–5.62)
SODIUM SERPL-SCNC: 138 MMOL/L (ref 135–147)
TRIGL SERPL-MCNC: 127 MG/DL
TSH SERPL DL<=0.05 MIU/L-ACNC: 2.17 UIU/ML (ref 0.45–4.5)
WBC # BLD AUTO: 12.04 THOUSAND/UL (ref 4.31–10.16)

## 2023-11-01 PROCEDURE — 84153 ASSAY OF PSA TOTAL: CPT

## 2023-11-01 PROCEDURE — 82306 VITAMIN D 25 HYDROXY: CPT

## 2023-11-01 PROCEDURE — 83036 HEMOGLOBIN GLYCOSYLATED A1C: CPT

## 2023-11-01 PROCEDURE — 80061 LIPID PANEL: CPT

## 2023-11-01 PROCEDURE — 36415 COLL VENOUS BLD VENIPUNCTURE: CPT

## 2023-11-01 PROCEDURE — 80053 COMPREHEN METABOLIC PANEL: CPT

## 2023-11-01 PROCEDURE — 85025 COMPLETE CBC W/AUTO DIFF WBC: CPT

## 2023-11-01 PROCEDURE — 84443 ASSAY THYROID STIM HORMONE: CPT

## 2023-11-15 ENCOUNTER — TELEPHONE (OUTPATIENT)
Dept: PSYCHIATRY | Facility: CLINIC | Age: 58
End: 2023-11-15

## 2023-11-15 NOTE — TELEPHONE ENCOUNTER
Patient has been added to the [facility] wait list without a referral.  Pt will try to get ref  Insurance: [employer]  Insurance Type:    Commercial []   Medicaid []   [place] (if applicable)   Medicare []  [facility]  Provider Preference: n/a  Virtual: Yes [x] No []  Were outside resources sent: Yes [x] No [] Via mail    Patient has been added to the [facility] wait list without a referral.  Pt will try to get ref  Insurance: [place] Type:    Commercial []   Medicaid []   [place] (if applicable)   Medicare []  Location Preference: [place]  Provider Preference: n/a  Virtual: Yes [x] No []  Were outside resources sent: Yes [x] No [] Via mail

## 2024-01-13 ENCOUNTER — APPOINTMENT (OUTPATIENT)
Dept: RADIOLOGY | Age: 59
End: 2024-01-13
Payer: MEDICARE

## 2024-01-13 ENCOUNTER — OFFICE VISIT (OUTPATIENT)
Dept: URGENT CARE | Age: 59
End: 2024-01-13
Payer: MEDICARE

## 2024-01-13 VITALS
RESPIRATION RATE: 16 BRPM | HEART RATE: 91 BPM | OXYGEN SATURATION: 98 % | TEMPERATURE: 97.5 F | HEIGHT: 71 IN | BODY MASS INDEX: 34.09 KG/M2

## 2024-01-13 DIAGNOSIS — S59.901A INJURY OF RIGHT ELBOW, INITIAL ENCOUNTER: ICD-10-CM

## 2024-01-13 DIAGNOSIS — S29.9XXA INJURY OF UPPER BACK, INITIAL ENCOUNTER: ICD-10-CM

## 2024-01-13 DIAGNOSIS — S39.92XA LOWER BACK INJURY, INITIAL ENCOUNTER: ICD-10-CM

## 2024-01-13 DIAGNOSIS — S39.92XA LOWER BACK INJURY, INITIAL ENCOUNTER: Primary | ICD-10-CM

## 2024-01-13 PROCEDURE — 73030 X-RAY EXAM OF SHOULDER: CPT

## 2024-01-13 PROCEDURE — 99213 OFFICE O/P EST LOW 20 MIN: CPT | Performed by: NURSE PRACTITIONER

## 2024-01-13 PROCEDURE — 73080 X-RAY EXAM OF ELBOW: CPT

## 2024-01-13 PROCEDURE — 72100 X-RAY EXAM L-S SPINE 2/3 VWS: CPT

## 2024-01-13 PROCEDURE — 72072 X-RAY EXAM THORAC SPINE 3VWS: CPT

## 2024-01-13 RX ORDER — PREDNISONE 50 MG/1
50 TABLET ORAL DAILY
Qty: 5 TABLET | Refills: 0 | Status: SHIPPED | OUTPATIENT
Start: 2024-01-13 | End: 2024-01-18
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
## 2024-01-30 ENCOUNTER — HOSPITAL ENCOUNTER (OUTPATIENT)
Dept: CT IMAGING | Facility: HOSPITAL | Age: 59
Discharge: HOME | End: 2024-01-30
Payer: MEDICARE

## 2024-01-30 DIAGNOSIS — Z72.0 TOBACCO ABUSE: ICD-10-CM

## 2024-01-30 DIAGNOSIS — Z87.891 PERSONAL HISTORY OF NICOTINE DEPENDENCE: ICD-10-CM

## 2024-01-30 PROCEDURE — 71271 CT THORAX LUNG CANCER SCR C-: CPT

## 2024-02-22 ENCOUNTER — TELEPHONE (OUTPATIENT)
Dept: NEUROLOGY | Facility: CLINIC | Age: 59
End: 2024-02-22

## 2024-02-22 NOTE — TELEPHONE ENCOUNTER
Patient calling to schedule new patient appointment for headaches. No testing done. Triage intake sent.

## 2024-02-29 ENCOUNTER — TELEPHONE (OUTPATIENT)
Dept: PSYCHIATRY | Facility: CLINIC | Age: 59
End: 2024-02-29

## 2024-03-24 ENCOUNTER — APPOINTMENT (EMERGENCY)
Dept: RADIOLOGY | Facility: HOSPITAL | Age: 59
End: 2024-03-24
Payer: MEDICARE

## 2024-03-24 ENCOUNTER — HOSPITAL ENCOUNTER (EMERGENCY)
Facility: HOSPITAL | Age: 59
Discharge: HOME | End: 2024-03-24
Attending: EMERGENCY MEDICINE
Payer: MEDICARE

## 2024-03-24 VITALS
SYSTOLIC BLOOD PRESSURE: 117 MMHG | OXYGEN SATURATION: 97 % | DIASTOLIC BLOOD PRESSURE: 53 MMHG | HEART RATE: 93 BPM | RESPIRATION RATE: 15 BRPM

## 2024-03-24 DIAGNOSIS — S09.90XA CLOSED HEAD INJURY, INITIAL ENCOUNTER: Primary | ICD-10-CM

## 2024-03-24 DIAGNOSIS — R14.0 ABDOMINAL BLOATING: ICD-10-CM

## 2024-03-24 PROCEDURE — 99284 EMERGENCY DEPT VISIT MOD MDM: CPT | Performed by: EMERGENCY MEDICINE

## 2024-03-24 PROCEDURE — 72125 CT NECK SPINE W/O DYE: CPT

## 2024-03-24 PROCEDURE — 96372 THER/PROPH/DIAG INJ SC/IM: CPT

## 2024-03-24 PROCEDURE — 70450 CT HEAD/BRAIN W/O DYE: CPT

## 2024-03-24 PROCEDURE — 99283 EMERGENCY DEPT VISIT LOW MDM: CPT

## 2024-03-24 RX ORDER — LIDOCAINE 50 MG/G
1 PATCH TOPICAL ONCE
Status: DISCONTINUED | OUTPATIENT
Start: 2024-03-24 | End: 2024-03-24 | Stop reason: HOSPADM

## 2024-03-24 RX ORDER — KETOROLAC TROMETHAMINE 30 MG/ML
15 INJECTION, SOLUTION INTRAMUSCULAR; INTRAVENOUS ONCE
Status: COMPLETED | OUTPATIENT
Start: 2024-03-24 | End: 2024-03-24

## 2024-03-24 RX ORDER — IBUPROFEN 400 MG/1
400 TABLET ORAL ONCE
Status: COMPLETED | OUTPATIENT
Start: 2024-03-24 | End: 2024-03-24

## 2024-03-24 RX ORDER — ACETAMINOPHEN 325 MG/1
650 TABLET ORAL ONCE
Status: COMPLETED | OUTPATIENT
Start: 2024-03-24 | End: 2024-03-24

## 2024-03-24 RX ORDER — NAPROXEN 500 MG/1
500 TABLET ORAL 2 TIMES DAILY WITH MEALS
Qty: 30 TABLET | Refills: 0 | Status: SHIPPED | OUTPATIENT
Start: 2024-03-24

## 2024-03-24 RX ADMIN — ACETAMINOPHEN 650 MG: 325 TABLET, FILM COATED ORAL at 06:19

## 2024-03-24 RX ADMIN — ACETAMINOPHEN 650 MG: 325 TABLET, FILM COATED ORAL at 02:36

## 2024-03-24 RX ADMIN — IBUPROFEN 400 MG: 400 TABLET, FILM COATED ORAL at 02:36

## 2024-03-24 RX ADMIN — KETOROLAC TROMETHAMINE 15 MG: 30 INJECTION, SOLUTION INTRAMUSCULAR; INTRAVENOUS at 03:42

## 2024-03-24 RX ADMIN — LIDOCAINE 1 PATCH: 50 PATCH TOPICAL at 06:19

## 2024-03-24 NOTE — DISCHARGE INSTRUCTIONS
Follow-up with gastroenterology.  CAT scans were negative for acute injury.  Use naproxen to assist with pain
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
## 2024-04-17 ENCOUNTER — TELEPHONE (OUTPATIENT)
Dept: NEUROLOGY | Facility: CLINIC | Age: 59
End: 2024-04-17

## 2024-04-17 NOTE — TELEPHONE ENCOUNTER
Received VM 4-15-24   2:55 pm  Taken off: 4-17-24  12:07 pm    Good afternoon, my name is Milan Lal My birthday is 1965. And my phone number is [phone]. I went through a phone call appointment screening and they sent somebody would call me and I haven't heard from you return my call. Thank you.  ----------------------------------------------------------------------------------------------------    Returned patient's call.Patient stated he spoke with someone about 3 weeks ago and was told he would hear back with a week.         CB# [phone] OK to leave a detailed message            [place] [employer], would you kindly assist? Thank you!

## 2024-04-22 ENCOUNTER — TELEPHONE (OUTPATIENT)
Dept: NEUROLOGY | Facility: CLINIC | Age: 59
End: 2024-04-22

## 2024-04-25 ENCOUNTER — CONSULT (OUTPATIENT)
Dept: NEUROLOGY | Facility: CLINIC | Age: 59
End: 2024-04-25
Payer: MEDICARE

## 2024-04-25 VITALS
OXYGEN SATURATION: 95 % | DIASTOLIC BLOOD PRESSURE: 82 MMHG | SYSTOLIC BLOOD PRESSURE: 138 MMHG | HEIGHT: 71 IN | HEART RATE: 103 BPM | BODY MASS INDEX: 35.46 KG/M2 | WEIGHT: 253.3 LBS | TEMPERATURE: 98.1 F

## 2024-04-25 DIAGNOSIS — F41.9 ANXIETY DISORDER: ICD-10-CM

## 2024-04-25 DIAGNOSIS — E66.9 OBESITY (BMI 30-39.9): ICD-10-CM

## 2024-04-25 DIAGNOSIS — F31.89 ATYPICAL BIPOLAR DISORDER (HCC): ICD-10-CM

## 2024-04-25 DIAGNOSIS — G43.709 CHRONIC MIGRAINE WITHOUT AURA WITHOUT STATUS MIGRAINOSUS, NOT INTRACTABLE: Primary | ICD-10-CM

## 2024-04-25 DIAGNOSIS — Z72.0 TOBACCO USE: ICD-10-CM

## 2024-04-25 DIAGNOSIS — M79.18 CERVICAL MYOFASCIAL PAIN SYNDROME: ICD-10-CM

## 2024-04-25 DIAGNOSIS — G47.33 OSA (OBSTRUCTIVE SLEEP APNEA): ICD-10-CM

## 2024-04-25 DIAGNOSIS — I72.0 CAROTID ARTERY ANEURYSM (HCC): ICD-10-CM

## 2024-04-25 PROCEDURE — 99205 OFFICE O/P NEW HI 60 MIN: CPT | Performed by: STUDENT IN AN ORGANIZED HEALTH CARE EDUCATION/TRAINING PROGRAM

## 2024-04-25 RX ORDER — TOPIRAMATE 25 MG/1
TABLET ORAL
Qty: 120 TABLET | Refills: 6 | Status: SHIPPED | OUTPATIENT
Start: 2024-04-25

## 2024-04-25 RX ORDER — RIZATRIPTAN BENZOATE 10 MG/1
10 TABLET ORAL AS NEEDED
Qty: 10 TABLET | Refills: 6 | Status: SHIPPED | OUTPATIENT
Start: 2024-04-25

## 2024-04-25 NOTE — PATIENT INSTRUCTIONS
Additional Testing:   Neurodiagnostic workup:  MRI/MRA Brain ordered  Sleep study    Referrals:  Physical therapy  Pain management    Headache Calendar  Please maintain a headache calendar  Consider using phone applications such as Migraine [employer] or [place] Migraine Tracker    Headache/migraine treatment:   Acute medications (for immediate treatment of a headache):   It is ok to take ibuprofen, acetaminophen or naproxen (Advil, Tylenol,  Aleve, Excedrin) if they help your headaches you should limit these to No more than 2-3 times a week to avoid medication overuse/rebound headaches.     For your more moderate to severe migraines take this medication early  Maxalt (rizatriptan) 10mg tabs - take one at the onset of headache. May repeat one time after 2 hours if pain has not resolved.   (Max 2 a day and 10 a month)    Prescription preventive medications for headaches/migraines   (to take every day to help prevent headaches - not to take at the time of headache):  Topiramate 25 mg nightly for 1 week, then increase to 25 mg in a.m. And 25 mg in p.m. For 1 week, then take 25 mg in a.m. And 50 mg in p.m. For 1 week, then take 50 mg in a.m. and 50 mg in p.m. And continue  - generally the common side effects improve as your body gets used to the medication.  If we need to spread out a more gradual increase of the medication on a longer scale we can, just call if any questions or concerns  - if necessary, if the a.m. dose is causing side effects we can always have you take the full dose at night instead    - important to know per data, this medication may, but typically does not affect birth control unless you are taking 200 mg daily or more and I highly recommend being on birth control while on this medication due to possible significant detrimental effects to fetus if you were to get pregnant     *Typically these types of medications take time until you see the benefit, although some may see improvement in days, often it  may take weeks, especially if the medication is being titrated up to a beneficial level. Please contact us if there are any concerns or questions regarding the medication.     Lifestyle Recommendations:  [x] SLEEP - Maintain a regular sleep schedule: Adults need at least 7-8 hours of uninterrupted a night. Maintain good sleep hygiene:  Going to bed and waking up at consistent times, avoiding excessive daytime naps, avoiding caffeinated beverages in the evening, avoid excessive stimulation in the evening and generally using bed primarily for sleeping.  One hour before bedtime would recommend turning lights down lower, decreasing your activity (may read quietly, listen to music at a low volume). When you get into bed, should eliminate all technology (no texting, emailing, playing with your phone, iPad or tablet in bed).  [x] HYDRATION - Maintain good hydration.  Drink  2L of fluid a day (4 typical small water bottles)  [x] DIET - Maintain good nutrition. In particular don't skip meals and try and eat healthy balanced meals regularly.  [x] TRIGGERS - Look for other triggers and avoid them: Limit caffeine to 1-2 cups a day or less. Avoid dietary triggers that you have noticed bring on your headaches (this could include aged cheese, peanuts, MSG, aspartame and nitrates).  [x] EXERCISE - physical exercise as we all know is good for you in many ways, and not only is good for your heart, but also is beneficial for your mental health, cognitive health and  chronic pain/headaches. I would encourage at the least 5 days of physical exercise weekly for at least 30 minutes.     Education and Follow-up  [x] Please call with any questions or concerns. Of course if any new concerning symptoms go to the emergency department.  [x] Follow up in 4 months
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
## 2024-04-30 ENCOUNTER — OFFICE VISIT (OUTPATIENT)
Dept: GASTROENTEROLOGY | Facility: CLINIC | Age: 59
End: 2024-04-30
Payer: MEDICARE

## 2024-04-30 VITALS
BODY MASS INDEX: 35.06 KG/M2 | SYSTOLIC BLOOD PRESSURE: 130 MMHG | DIASTOLIC BLOOD PRESSURE: 90 MMHG | TEMPERATURE: 97.1 F | HEIGHT: 71 IN | WEIGHT: 250.4 LBS

## 2024-04-30 DIAGNOSIS — G47.33 OSA (OBSTRUCTIVE SLEEP APNEA): ICD-10-CM

## 2024-04-30 DIAGNOSIS — R10.9 ABDOMINAL CRAMPING: ICD-10-CM

## 2024-04-30 DIAGNOSIS — R19.8 ALTERNATING CONSTIPATION AND DIARRHEA: ICD-10-CM

## 2024-04-30 DIAGNOSIS — R14.0 BLOATING: Primary | ICD-10-CM

## 2024-04-30 DIAGNOSIS — R19.05 PERIUMBILICAL MASS: ICD-10-CM

## 2024-04-30 DIAGNOSIS — E66.09 CLASS 1 OBESITY DUE TO EXCESS CALORIES WITH BODY MASS INDEX (BMI) OF 34.0 TO 34.9 IN ADULT, UNSPECIFIED WHETHER SERIOUS COMORBIDITY PRESENT: Primary | ICD-10-CM

## 2024-04-30 DIAGNOSIS — R11.0 NAUSEA: ICD-10-CM

## 2024-04-30 PROCEDURE — 99204 OFFICE O/P NEW MOD 45 MIN: CPT | Performed by: PHYSICIAN ASSISTANT

## 2024-04-30 RX ORDER — ONDANSETRON 4 MG/1
4 TABLET, ORALLY DISINTEGRATING ORAL EVERY 6 HOURS PRN
Qty: 30 TABLET | Refills: 1 | Status: SHIPPED | OUTPATIENT
Start: 2024-04-30 | End: 2024-05-30

## 2024-04-30 RX ORDER — DICYCLOMINE HCL 20 MG
20 TABLET ORAL EVERY 6 HOURS
Qty: 60 TABLET | Refills: 2 | Status: SHIPPED | OUTPATIENT
Start: 2024-04-30

## 2024-04-30 NOTE — PATIENT INSTRUCTIONS
Scheduled date of EGD/colonoscopy (as of today):07.17.24  Physician performing EGD/colonoscopy:DR MAIER  Location of EGD/colonoscopy:[facility]  Desired bowel prep reviewed with patient:ANGELI/MARY  Instructions reviewed with patient by:JUDITH  Clearances:  N/A  Pt will call to schedule U/S
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
## 2024-05-16 ENCOUNTER — CONSULT (OUTPATIENT)
Dept: PAIN MEDICINE | Facility: CLINIC | Age: 59
End: 2024-05-16
Payer: MEDICARE

## 2024-05-16 VITALS
SYSTOLIC BLOOD PRESSURE: 152 MMHG | DIASTOLIC BLOOD PRESSURE: 99 MMHG | WEIGHT: 255.6 LBS | BODY MASS INDEX: 35.78 KG/M2 | HEIGHT: 71 IN | HEART RATE: 75 BPM

## 2024-05-16 DIAGNOSIS — M47.816 LUMBAR SPONDYLOSIS: ICD-10-CM

## 2024-05-16 DIAGNOSIS — M79.18 CERVICAL MYOFASCIAL PAIN SYNDROME: ICD-10-CM

## 2024-05-16 DIAGNOSIS — G89.29 CHRONIC BILATERAL LOW BACK PAIN, UNSPECIFIED WHETHER SCIATICA PRESENT: ICD-10-CM

## 2024-05-16 DIAGNOSIS — M54.2 NECK PAIN: Primary | ICD-10-CM

## 2024-05-16 DIAGNOSIS — M54.12 CERVICAL RADICULOPATHY: ICD-10-CM

## 2024-05-16 DIAGNOSIS — M54.50 CHRONIC BILATERAL LOW BACK PAIN, UNSPECIFIED WHETHER SCIATICA PRESENT: ICD-10-CM

## 2024-05-16 DIAGNOSIS — M47.812 CERVICAL SPONDYLOSIS: ICD-10-CM

## 2024-05-16 PROCEDURE — G2211 COMPLEX E/M VISIT ADD ON: HCPCS | Performed by: ANESTHESIOLOGY

## 2024-05-16 PROCEDURE — 99204 OFFICE O/P NEW MOD 45 MIN: CPT | Performed by: ANESTHESIOLOGY
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
## 2024-05-29 ENCOUNTER — TELEPHONE (OUTPATIENT)
Dept: GASTROENTEROLOGY | Facility: CLINIC | Age: 59
End: 2024-05-29

## 2024-05-29 NOTE — TELEPHONE ENCOUNTER
I called and lvm for the patient to reschedule their appointment on 9/17 with Vanita Velásquez PA-C because she has become unavailable.

## 2024-05-30 ENCOUNTER — TELEPHONE (OUTPATIENT)
Dept: PSYCHIATRY | Facility: CLINIC | Age: 59
End: 2024-05-30

## 2024-05-30 NOTE — TELEPHONE ENCOUNTER
Patient called asking his place on the wait list. Writer explained we do not have available appointments at this time and there is no timeframe as when he will be called. Writer offered an additional resource packet and patient expressed his understanding. An additional resource packet was sent to patient.

## 2024-06-03 DIAGNOSIS — R10.9 ABDOMINAL CRAMPING: ICD-10-CM

## 2024-06-04 RX ORDER — DICYCLOMINE HCL 20 MG
20 TABLET ORAL EVERY 6 HOURS
Qty: 60 TABLET | Refills: 2 | Status: SHIPPED | OUTPATIENT
Start: 2024-06-04

## 2024-06-06 ENCOUNTER — TELEPHONE (OUTPATIENT)
Dept: PAIN MEDICINE | Facility: CLINIC | Age: 59
End: 2024-06-06

## 2024-06-11 NOTE — TELEPHONE ENCOUNTER
Caller: patient    Doctor: Nena    Reason for call: would like to reschedule today procedure    Call back#:

## 2024-06-19 NOTE — PROGRESS NOTES
59-year-old male presents to the office today for trigger point injections into the trapezii musculature, bilateral lumbar paraspinal musculature, and latissimus dorsi musculature    After discussing the risks of the procedure including, but not limited to: unchanged or increased pain, bleeding, infection, allergic reaction, soft-tissue injury, nerve damage, pneumothorax, informed consent was obtained.  Procedural pause conducted to verify: correct patient identity, procedure to be performed. The appropriate hyper irritable musculoskeletal tender points were marked with a sterile pen, prepped with alcohol and sterilely draped.   After appropriate reproduction of pain at each of the tender points marked, a total of 10 mL of 0.2% ropivacaine and 40mg of Depo-Medrol was injected after negative aspiration of air, CSF, heme, or other bodily fluids with a 1.5 in 25-gauge needle. A total number of 3 muscle groups were injected. Additionally, dry-needling in a fanlike distribution was performed at each site. The patient was discharged with no apparent complications on their own power after an appropriate observation period.  Disposition: Motor function was intact. The patient was discharged home. The discharge instruction sheet was given to the patient. The patient was discharged with instructions to call immediately if there are any complications.  I did review the trigger point injection discharge instructions with the patient. I explained that the best results from the injections typically occur within the next 3-5 days. I did explain that it is normal to feel an increase in soreness and/or pain, and even bruising. I did encourage heat/cold regiments, which ever decreases pain, as well as continuing a home stretching program.

## 2024-06-20 ENCOUNTER — PROCEDURE VISIT (OUTPATIENT)
Dept: PAIN MEDICINE | Facility: CLINIC | Age: 59
End: 2024-06-20
Payer: MEDICARE

## 2024-06-20 VITALS
SYSTOLIC BLOOD PRESSURE: 161 MMHG | HEIGHT: 71 IN | BODY MASS INDEX: 35.7 KG/M2 | DIASTOLIC BLOOD PRESSURE: 95 MMHG | WEIGHT: 255 LBS | HEART RATE: 72 BPM

## 2024-06-20 DIAGNOSIS — M79.18 MYOFASCIAL PAIN SYNDROME: Primary | ICD-10-CM

## 2024-06-20 PROCEDURE — 20553 NJX 1/MLT TRIGGER POINTS 3/>: CPT | Performed by: NURSE PRACTITIONER

## 2024-06-20 RX ORDER — METHYLPREDNISOLONE ACETATE 40 MG/ML
40 INJECTION, SUSPENSION INTRA-ARTICULAR; INTRALESIONAL; INTRAMUSCULAR; SOFT TISSUE ONCE
Status: COMPLETED | OUTPATIENT
Start: 2024-06-20 | End: 2024-06-20

## 2024-06-20 RX ORDER — ROPIVACAINE HYDROCHLORIDE 2 MG/ML
20 INJECTION, SOLUTION EPIDURAL; INFILTRATION; PERINEURAL ONCE
Status: COMPLETED | OUTPATIENT
Start: 2024-06-20 | End: 2024-06-20

## 2024-06-20 RX ADMIN — METHYLPREDNISOLONE ACETATE 40 MG: 40 INJECTION, SUSPENSION INTRA-ARTICULAR; INTRALESIONAL; INTRAMUSCULAR; SOFT TISSUE at 08:41

## 2024-06-20 RX ADMIN — ROPIVACAINE HYDROCHLORIDE 20 ML: 2 INJECTION, SOLUTION EPIDURAL; INFILTRATION; PERINEURAL at 08:42

## 2024-06-20 NOTE — LETTER
June 20, 2024     Patient: Milan Lal  YOB: 1965  Date of Visit: 6/20/2024      To Whom it May Concern:    Milan Lal is under my professional care. Milan was seen in my office on 6/20/2024. Milan may return to work on 06/21/2024 .    If you have any questions or concerns, please don't hesitate to call.         Sincerely,          BECKIE Mckeon        CC:   No Recipients

## 2024-07-03 ENCOUNTER — TELEPHONE (OUTPATIENT)
Dept: PAIN MEDICINE | Facility: CLINIC | Age: 59
End: 2024-07-03

## 2024-07-16 ENCOUNTER — TELEPHONE (OUTPATIENT)
Dept: GASTROENTEROLOGY | Facility: HOSPITAL | Age: 59
End: 2024-07-16

## 2024-07-17 ENCOUNTER — ANESTHESIA (OUTPATIENT)
Dept: GASTROENTEROLOGY | Facility: HOSPITAL | Age: 59
End: 2024-07-17

## 2024-07-17 ENCOUNTER — HOSPITAL ENCOUNTER (OUTPATIENT)
Dept: GASTROENTEROLOGY | Facility: HOSPITAL | Age: 59
Setting detail: OUTPATIENT SURGERY
Discharge: HOME | End: 2024-07-17
Attending: INTERNAL MEDICINE | Admitting: INTERNAL MEDICINE
Payer: MEDICARE

## 2024-07-17 ENCOUNTER — ANESTHESIA EVENT (OUTPATIENT)
Dept: GASTROENTEROLOGY | Facility: HOSPITAL | Age: 59
End: 2024-07-17

## 2024-07-17 VITALS
HEART RATE: 77 BPM | WEIGHT: 252 LBS | SYSTOLIC BLOOD PRESSURE: 142 MMHG | DIASTOLIC BLOOD PRESSURE: 76 MMHG | RESPIRATION RATE: 16 BRPM | BODY MASS INDEX: 34.13 KG/M2 | TEMPERATURE: 96.4 F | OXYGEN SATURATION: 96 % | HEIGHT: 72 IN

## 2024-07-17 DIAGNOSIS — R14.0 BLOATING: ICD-10-CM

## 2024-07-17 DIAGNOSIS — R11.0 NAUSEA: ICD-10-CM

## 2024-07-17 DIAGNOSIS — R19.8 ALTERNATING CONSTIPATION AND DIARRHEA: ICD-10-CM

## 2024-07-17 DIAGNOSIS — R10.9 ABDOMINAL CRAMPING: ICD-10-CM

## 2024-07-17 PROCEDURE — 43239 EGD BIOPSY SINGLE/MULTIPLE: CPT | Performed by: INTERNAL MEDICINE

## 2024-07-17 PROCEDURE — 45380 COLONOSCOPY AND BIOPSY: CPT | Performed by: INTERNAL MEDICINE

## 2024-07-17 PROCEDURE — 88305 TISSUE EXAM BY PATHOLOGIST: CPT | Performed by: PATHOLOGY

## 2024-07-17 PROCEDURE — 45385 COLONOSCOPY W/LESION REMOVAL: CPT | Performed by: INTERNAL MEDICINE

## 2024-07-17 RX ORDER — SODIUM CHLORIDE 9 MG/ML
INJECTION, SOLUTION INTRAVENOUS CONTINUOUS PRN
Status: DISCONTINUED | OUTPATIENT
Start: 2024-07-17 | End: 2024-07-17

## 2024-07-17 RX ORDER — LIDOCAINE HYDROCHLORIDE 20 MG/ML
INJECTION, SOLUTION EPIDURAL; INFILTRATION; INTRACAUDAL; PERINEURAL AS NEEDED
Status: DISCONTINUED | OUTPATIENT
Start: 2024-07-17 | End: 2024-07-17

## 2024-07-17 RX ORDER — PROPOFOL 10 MG/ML
INJECTION, EMULSION INTRAVENOUS AS NEEDED
Status: DISCONTINUED | OUTPATIENT
Start: 2024-07-17 | End: 2024-07-17

## 2024-07-17 RX ORDER — FENTANYL CITRATE 50 UG/ML
INJECTION, SOLUTION INTRAMUSCULAR; INTRAVENOUS AS NEEDED
Status: DISCONTINUED | OUTPATIENT
Start: 2024-07-17 | End: 2024-07-17

## 2024-07-17 RX ADMIN — PROPOFOL 70 MG: 10 INJECTION, EMULSION INTRAVENOUS at 08:12

## 2024-07-17 RX ADMIN — PROPOFOL 30 MG: 10 INJECTION, EMULSION INTRAVENOUS at 08:32

## 2024-07-17 RX ADMIN — PROPOFOL 50 MG: 10 INJECTION, EMULSION INTRAVENOUS at 08:09

## 2024-07-17 RX ADMIN — LIDOCAINE HYDROCHLORIDE 5 ML: 20 INJECTION, SOLUTION EPIDURAL; INFILTRATION; INTRACAUDAL at 08:00

## 2024-07-17 RX ADMIN — PROPOFOL 50 MG: 10 INJECTION, EMULSION INTRAVENOUS at 08:22

## 2024-07-17 RX ADMIN — PROPOFOL 50 MG: 10 INJECTION, EMULSION INTRAVENOUS at 08:19

## 2024-07-17 RX ADMIN — FENTANYL CITRATE 25 MCG: 50 INJECTION INTRAMUSCULAR; INTRAVENOUS at 08:03

## 2024-07-17 RX ADMIN — PROPOFOL 40 MG: 10 INJECTION, EMULSION INTRAVENOUS at 08:03

## 2024-07-17 RX ADMIN — FENTANYL CITRATE 50 MCG: 50 INJECTION INTRAMUSCULAR; INTRAVENOUS at 08:00

## 2024-07-17 RX ADMIN — SODIUM CHLORIDE: 9 INJECTION, SOLUTION INTRAVENOUS at 07:56

## 2024-07-17 RX ADMIN — PROPOFOL 180 MG: 10 INJECTION, EMULSION INTRAVENOUS at 08:00

## 2024-07-17 RX ADMIN — FENTANYL CITRATE 25 MCG: 50 INJECTION INTRAMUSCULAR; INTRAVENOUS at 08:06

## 2024-07-17 RX ADMIN — PROPOFOL 50 MG: 10 INJECTION, EMULSION INTRAVENOUS at 08:29

## 2024-07-17 RX ADMIN — PROPOFOL 50 MG: 10 INJECTION, EMULSION INTRAVENOUS at 08:06

## 2024-07-17 RX ADMIN — PROPOFOL 40 MG: 10 INJECTION, EMULSION INTRAVENOUS at 08:15

## 2024-07-17 RX ADMIN — PROPOFOL 30 MG: 10 INJECTION, EMULSION INTRAVENOUS at 08:34

## 2024-07-17 RX ADMIN — PROPOFOL 50 MG: 10 INJECTION, EMULSION INTRAVENOUS at 08:25

## 2024-07-17 NOTE — ANESTHESIA POSTPROCEDURE EVALUATION
Post-Op Assessment Note    CV Status:  Stable  Pain Score: 0    Pain management: adequate       Mental Status:  Alert and awake   Hydration Status:  Euvolemic   PONV Controlled:  Controlled   Airway Patency:  Patent     Post Op Vitals Reviewed: Yes    No anethesia notable event occurred.    Staff: JEANIE           /76 (07/17/24 0841)    Temp (!) 96.4 °F (35.8 °C) (07/17/24 0841)    Pulse 89 (07/17/24 0841)   Resp 16 (07/17/24 0841)    SpO2 95 % (07/17/24 0841)

## 2024-07-17 NOTE — ANESTHESIA PREPROCEDURE EVALUATION
Procedure:  EGD  COLONOSCOPY    Had back and nasal fracture surgery, had early emergence per patient     Review of Systems/Medical History  Patient summary reviewed.  Chart reviewed.  History of anesthetic complications (difficult post op pain management, quick emergence)     Cardiovascular  EKG reviewed. Exercise tolerance (METS): >4 METS. Hypertension (no meds)     Pulmonary   Smoker (1/2 ppd) cigarette smoker   , Asthma (no meds)          GI/Hepatic  Negative GI/hepatic ROS          Negative  ROS        Endo/Other  Negative endo/other ROS       GYN       Hematology  Negative hematology ROS ,     Musculoskeletal   Back pain , lumbar pain        Neurology   Seizures (s/p seizure with fall -> facial fractures.  1 mo ago. Grand mal type) ,     Psychology    Anxiety  Chronic opioid dependence (on and off percocet for hx LBP, now fracture pain) Chronic pain           Physical Exam    Airway    Mallampati score: II  TM Distance: >3 FB  Neck ROM: full     Dental   Comment: Bridge removed; denies loose, No notable dental hx     Cardiovascular  Cardiovascular exam normal    Pulmonary  Pulmonary exam normal Decreased breath sounds    Other Findings       Lab Results   Component Value Date    WBC 12.04 (H) 11/01/2023    HGB 16.1 11/01/2023     11/01/2023     Lab Results   Component Value Date     07/28/2014    K 3.6 11/01/2023    BUN 16 11/01/2023    CREATININE 0.93 11/01/2023    GLUCOSE 90 07/28/2014     Anesthesia Plan  ASA Score- 2     Anesthesia Type- IV sedation with anesthesia with ASA Monitors.         Additional Monitors:     Airway Plan:            Plan Factors-Exercise tolerance (METS): >4 METS.    Chart reviewed. EKG reviewed.  Existing labs reviewed. Patient summary reviewed.    Patient is a current smoker.  Patient instructed to abstain from smoking on day of procedure. Patient smoked on day of surgery.            Induction-     Postoperative Plan-     Perioperative Resuscitation Plan - Level 1  - Full Code.       Informed Consent- Anesthetic plan and risks discussed with patient.  I personally reviewed this patient with the CRNA. Discussed and agreed on the Anesthesia Plan with the CRNA..
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
## 2024-07-22 DIAGNOSIS — K22.70 BARRETT'S ESOPHAGUS WITHOUT DYSPLASIA: Primary | ICD-10-CM

## 2024-07-22 PROCEDURE — 88305 TISSUE EXAM BY PATHOLOGIST: CPT | Performed by: PATHOLOGY

## 2024-07-22 RX ORDER — OMEPRAZOLE 20 MG/1
20 CAPSULE, DELAYED RELEASE ORAL DAILY
Qty: 30 CAPSULE | Refills: 11 | Status: SHIPPED | OUTPATIENT
Start: 2024-07-22

## 2024-07-31 DIAGNOSIS — R11.0 NAUSEA: ICD-10-CM

## 2024-07-31 RX ORDER — ONDANSETRON 4 MG/1
4 TABLET, ORALLY DISINTEGRATING ORAL EVERY 6 HOURS PRN
Qty: 30 TABLET | Refills: 1 | Status: SHIPPED | OUTPATIENT
Start: 2024-07-31 | End: 2024-08-30

## 2024-08-10 DIAGNOSIS — R11.0 NAUSEA: ICD-10-CM

## 2024-08-11 RX ORDER — ONDANSETRON 4 MG/1
4 TABLET, ORALLY DISINTEGRATING ORAL EVERY 6 HOURS PRN
Qty: 30 TABLET | Refills: 1 | Status: SHIPPED | OUTPATIENT
Start: 2024-08-11 | End: 2024-09-10

## 2024-09-06 ENCOUNTER — TELEPHONE (OUTPATIENT)
Dept: NEUROLOGY | Facility: CLINIC | Age: 59
End: 2024-09-06

## 2024-09-06 NOTE — TELEPHONE ENCOUNTER
LMOM for pt to confirm their   3p  appt on   9/9  w/ Amado . Reminded pt to arrive 15 mins prior to appt to check in with . Gave call back number [phone] to cancel/reschedule.

## 2024-09-10 ENCOUNTER — TELEPHONE (OUTPATIENT)
Age: 59
End: 2024-09-10

## 2024-09-10 ENCOUNTER — APPOINTMENT (OUTPATIENT)
Dept: LAB | Facility: CLINIC | Age: 59
End: 2024-09-10
Payer: MEDICARE

## 2024-09-10 DIAGNOSIS — Z79.899 ENCOUNTER FOR LONG-TERM (CURRENT) USE OF OTHER MEDICATIONS: ICD-10-CM

## 2024-09-10 LAB
25(OH)D3 SERPL-MCNC: 17.2 NG/ML (ref 30–100)
ALBUMIN SERPL BCG-MCNC: 4.1 G/DL (ref 3.5–5)
ALP SERPL-CCNC: 106 U/L (ref 34–104)
ALT SERPL W P-5'-P-CCNC: 23 U/L (ref 7–52)
AMPHETAMINES SERPL QL SCN: POSITIVE
ANION GAP SERPL CALCULATED.3IONS-SCNC: 12 MMOL/L (ref 4–13)
AST SERPL W P-5'-P-CCNC: 21 U/L (ref 13–39)
BARBITURATES UR QL: NEGATIVE
BASOPHILS # BLD AUTO: 0.1 THOUSANDS/ÂΜL (ref 0–0.1)
BASOPHILS NFR BLD AUTO: 1 % (ref 0–1)
BENZODIAZ UR QL: NEGATIVE
BILIRUB SERPL-MCNC: 0.62 MG/DL (ref 0.2–1)
BUN SERPL-MCNC: 11 MG/DL (ref 5–25)
CALCIUM SERPL-MCNC: 8.8 MG/DL (ref 8.4–10.2)
CHLORIDE SERPL-SCNC: 104 MMOL/L (ref 96–108)
CHOLEST SERPL-MCNC: 208 MG/DL
CO2 SERPL-SCNC: 23 MMOL/L (ref 21–32)
COCAINE UR QL: NEGATIVE
CREAT SERPL-MCNC: 0.92 MG/DL (ref 0.6–1.3)
EOSINOPHIL # BLD AUTO: 0.39 THOUSAND/ÂΜL (ref 0–0.61)
EOSINOPHIL NFR BLD AUTO: 5 % (ref 0–6)
ERYTHROCYTE [DISTWIDTH] IN BLOOD BY AUTOMATED COUNT: 14.9 % (ref 11.6–15.1)
FENTANYL UR QL SCN: NEGATIVE
GFR SERPL CREATININE-BSD FRML MDRD: 91 ML/MIN/1.73SQ M
GLUCOSE P FAST SERPL-MCNC: 100 MG/DL (ref 65–99)
HCT VFR BLD AUTO: 42.1 % (ref 36.5–49.3)
HDLC SERPL-MCNC: 60 MG/DL
HGB BLD-MCNC: 14.1 G/DL (ref 12–17)
HYDROCODONE UR QL SCN: NEGATIVE
IMM GRANULOCYTES # BLD AUTO: 0.04 THOUSAND/UL (ref 0–0.2)
IMM GRANULOCYTES NFR BLD AUTO: 1 % (ref 0–2)
LDLC SERPL CALC-MCNC: 114 MG/DL (ref 0–100)
LYMPHOCYTES # BLD AUTO: 1.85 THOUSANDS/ÂΜL (ref 0.6–4.47)
LYMPHOCYTES NFR BLD AUTO: 21 % (ref 14–44)
MCH RBC QN AUTO: 31.2 PG (ref 26.8–34.3)
MCHC RBC AUTO-ENTMCNC: 33.5 G/DL (ref 31.4–37.4)
MCV RBC AUTO: 93 FL (ref 82–98)
METHADONE UR QL: NEGATIVE
MONOCYTES # BLD AUTO: 0.67 THOUSAND/ÂΜL (ref 0.17–1.22)
MONOCYTES NFR BLD AUTO: 8 % (ref 4–12)
NEUTROPHILS # BLD AUTO: 5.71 THOUSANDS/ÂΜL (ref 1.85–7.62)
NEUTS SEG NFR BLD AUTO: 64 % (ref 43–75)
NONHDLC SERPL-MCNC: 148 MG/DL
NRBC BLD AUTO-RTO: 0 /100 WBCS
OPIATES UR QL SCN: NEGATIVE
OXYCODONE+OXYMORPHONE UR QL SCN: NEGATIVE
PCP UR QL: NEGATIVE
PLATELET # BLD AUTO: 391 THOUSANDS/UL (ref 149–390)
PMV BLD AUTO: 9.5 FL (ref 8.9–12.7)
POTASSIUM SERPL-SCNC: 3.3 MMOL/L (ref 3.5–5.3)
PROT SERPL-MCNC: 6.8 G/DL (ref 6.4–8.4)
RBC # BLD AUTO: 4.52 MILLION/UL (ref 3.88–5.62)
SODIUM SERPL-SCNC: 139 MMOL/L (ref 135–147)
THC UR QL: POSITIVE
TRIGL SERPL-MCNC: 170 MG/DL
TSH SERPL DL<=0.05 MIU/L-ACNC: 2.75 UIU/ML (ref 0.45–4.5)
WBC # BLD AUTO: 8.76 THOUSAND/UL (ref 4.31–10.16)

## 2024-09-10 PROCEDURE — 84443 ASSAY THYROID STIM HORMONE: CPT

## 2024-09-10 PROCEDURE — 80061 LIPID PANEL: CPT

## 2024-09-10 PROCEDURE — 82306 VITAMIN D 25 HYDROXY: CPT

## 2024-09-10 PROCEDURE — 83036 HEMOGLOBIN GLYCOSYLATED A1C: CPT

## 2024-09-10 PROCEDURE — 80053 COMPREHEN METABOLIC PANEL: CPT

## 2024-09-10 PROCEDURE — 85025 COMPLETE CBC W/AUTO DIFF WBC: CPT

## 2024-09-10 PROCEDURE — 80307 DRUG TEST PRSMV CHEM ANLYZR: CPT

## 2024-09-10 PROCEDURE — 36415 COLL VENOUS BLD VENIPUNCTURE: CPT

## 2024-09-10 NOTE — TELEPHONE ENCOUNTER
[facility] called stating they received the pathology report of the basal cell carcinoma in the right cheek but does not have the procedure note, they asked if we could please fax over the Mohs note from 8/7/23 to [phone]

## 2024-09-11 LAB
EST. AVERAGE GLUCOSE BLD GHB EST-MCNC: 103 MG/DL
HBA1C MFR BLD: 5.2 %

## 2024-10-24 DIAGNOSIS — G43.709 CHRONIC MIGRAINE WITHOUT AURA WITHOUT STATUS MIGRAINOSUS, NOT INTRACTABLE: ICD-10-CM

## 2024-10-24 RX ORDER — RIZATRIPTAN BENZOATE 10 MG/1
10 TABLET ORAL AS NEEDED
Qty: 10 TABLET | Refills: 0 | Status: SHIPPED | OUTPATIENT
Start: 2024-10-24

## 2024-10-24 NOTE — TELEPHONE ENCOUNTER
Patient needs an appointment. Please contact the patient to schedule an appointment. Last office visit: 4/25/2024    Pt was to follow up in 6 months

## 2024-11-05 DIAGNOSIS — G43.709 CHRONIC MIGRAINE WITHOUT AURA WITHOUT STATUS MIGRAINOSUS, NOT INTRACTABLE: ICD-10-CM

## 2024-11-07 RX ORDER — TOPIRAMATE 25 MG/1
TABLET, FILM COATED ORAL
Qty: 120 TABLET | Refills: 6 | OUTPATIENT
Start: 2024-11-07

## 2024-11-07 NOTE — TELEPHONE ENCOUNTER
November 6, 2024  Symone Burns   to Sami Fischer DO  Neurology Pod Clerical   [facility]    11/6/24  9:32 AM  Patient needs an appointment. Please contact the patient to schedule an appointment.    Please review to see if the refill is appropriate.     Last OV: 04/25/2024  Per last OV: Return in about 4 months (around 8/25/2024).  Upcoming visit: None    No Show for 09/09/2024 appointment w/ Sami Fischer DO  Canceled 06/24/2024 appointment w/ Balta Blackmon PA-C

## 2024-11-07 NOTE — TELEPHONE ENCOUNTER
Attempted to call pt at [phone], received message-Subscriber you have dialed is not in service    Called pharm to see if they have alternate phone number.  He has [phone] listed.      This is the number that I tried to call.      Kyield message sent to pt    Please send refill if agreeable or refuse refill to close encounter

## 2024-11-07 NOTE — TELEPHONE ENCOUNTER
Sami Fischer, DO   to Neurology Concussion & Migraine Team 5       11/6/24  9:59 AM  Please clarify if patient is still taking this medication.  He canceled his follow-up with Leo and no-lindsey for my appointment in September.  He also has nothing scheduled going forward.

## 2024-11-16 ENCOUNTER — HOSPITAL ENCOUNTER (EMERGENCY)
Facility: HOSPITAL | Age: 59
Discharge: HOME | End: 2024-11-16
Attending: EMERGENCY MEDICINE
Payer: MEDICARE

## 2024-11-16 VITALS
RESPIRATION RATE: 20 BRPM | SYSTOLIC BLOOD PRESSURE: 165 MMHG | OXYGEN SATURATION: 95 % | HEART RATE: 120 BPM | TEMPERATURE: 97.2 F | DIASTOLIC BLOOD PRESSURE: 109 MMHG

## 2024-11-16 DIAGNOSIS — Z02.83 ENCOUNTER FOR DRUG SCREENING: Primary | ICD-10-CM

## 2024-11-16 DIAGNOSIS — F10.20 ALCOHOL USE DISORDER, SEVERE, DEPENDENCE (HCC): ICD-10-CM

## 2024-11-16 DIAGNOSIS — F10.939 ALCOHOL WITHDRAWAL (HCC): ICD-10-CM

## 2024-11-16 PROCEDURE — 99283 EMERGENCY DEPT VISIT LOW MDM: CPT | Performed by: EMERGENCY MEDICINE

## 2024-11-16 PROCEDURE — 99284 EMERGENCY DEPT VISIT MOD MDM: CPT
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
## 2024-12-12 ENCOUNTER — OFFICE VISIT (OUTPATIENT)
Dept: GASTROENTEROLOGY | Facility: CLINIC | Age: 59
End: 2024-12-12
Payer: MEDICARE

## 2024-12-12 VITALS
TEMPERATURE: 98.4 F | HEIGHT: 72 IN | WEIGHT: 235 LBS | BODY MASS INDEX: 31.83 KG/M2 | DIASTOLIC BLOOD PRESSURE: 70 MMHG | SYSTOLIC BLOOD PRESSURE: 120 MMHG

## 2024-12-12 DIAGNOSIS — R11.0 NAUSEA: ICD-10-CM

## 2024-12-12 DIAGNOSIS — K58.0 IRRITABLE BOWEL SYNDROME WITH DIARRHEA: ICD-10-CM

## 2024-12-12 DIAGNOSIS — Z86.0109 PERSONAL HISTORY OF OTHER COLON POLYPS: ICD-10-CM

## 2024-12-12 DIAGNOSIS — K22.70 BARRETT'S ESOPHAGUS WITHOUT DYSPLASIA: Primary | ICD-10-CM

## 2024-12-12 DIAGNOSIS — R10.9 ABDOMINAL CRAMPING: ICD-10-CM

## 2024-12-12 PROCEDURE — 99214 OFFICE O/P EST MOD 30 MIN: CPT | Performed by: PHYSICIAN ASSISTANT

## 2024-12-12 RX ORDER — LOPERAMIDE HYDROCHLORIDE 2 MG/1
2 CAPSULE ORAL 4 TIMES DAILY PRN
Qty: 60 CAPSULE | Refills: 0 | Status: SHIPPED | OUTPATIENT
Start: 2024-12-12 | End: 2024-12-12

## 2024-12-12 RX ORDER — ONDANSETRON 4 MG/1
4 TABLET, ORALLY DISINTEGRATING ORAL EVERY 6 HOURS PRN
Qty: 60 TABLET | Refills: 3 | Status: SHIPPED | OUTPATIENT
Start: 2024-12-12 | End: 2025-01-11

## 2024-12-12 RX ORDER — DEXLANSOPRAZOLE 60 MG/1
60 CAPSULE, DELAYED RELEASE ORAL DAILY
Qty: 90 CAPSULE | Refills: 3 | Status: SHIPPED | OUTPATIENT
Start: 2024-12-12

## 2024-12-12 RX ORDER — LOPERAMIDE HYDROCHLORIDE 2 MG/1
4 CAPSULE ORAL 4 TIMES DAILY PRN
Qty: 60 CAPSULE | Refills: 3 | Status: SHIPPED | OUTPATIENT
Start: 2024-12-12

## 2024-12-12 RX ORDER — DICYCLOMINE HCL 20 MG
20 TABLET ORAL EVERY 6 HOURS
Qty: 90 TABLET | Refills: 3 | Status: SHIPPED | OUTPATIENT
Start: 2024-12-12

## 2024-12-12 NOTE — PROGRESS NOTES
Name: Milan Lal      : 1965      MRN: [id number]  Encounter Provider: Vanita Velásquez PA-C  Encounter Date: 2024   Encounter department: [facility] [place]  :  Assessment & Plan  Hoffmann's esophagus without dysplasia  Identified on recent EGD from 2024. No evidence of dysplasia on biopsy. Patient has tried and failed omeprazole, pantoprazole, famotidine. He is interested in trying dexlansoprazole. Hopefully we can get this covered through his insurance. He has already made significant dietary changes.  Continue to avoid eating within 3 hours of bedtime and sleep with head of bed elevated at night. Plan for repeat EGD in 5 years for surveillance.    Orders:    dexlansoprazole (DEXILANT) 60 MG capsule; Take 1 capsule (60 mg total) by mouth daily in the early morning    Irritable bowel syndrome with diarrhea  I suspect his abdominal cramping and diarrhea with occasional constipation is related to IBS. Recent EGD/colonoscopy negative for celiac, IBD, microscopic colitis. Discussed trial of lactose-free diet for 4 to 6 weeks. Continue dicyclomine as needed for abdominal cramping and sent prescription for loperamide to take as needed for diarrhea. Would consider trial of rifaximin if symptoms persist.    Orders:    loperamide (IMODIUM) 2 mg capsule; Take 2 capsules (4 mg total) by mouth 4 (four) times a day as needed for diarrhea    Nausea    Orders:    ondansetron (ZOFRAN-ODT) 4 mg disintegrating tablet; Take 1 tablet (4 mg total) by mouth every 6 (six) hours as needed for nausea or vomiting    Abdominal cramping    Orders:    dicyclomine (BENTYL) 20 mg tablet; Take 1 tablet (20 mg total) by mouth every 6 (six) hours as needed for abdominal cramping    Personal history of other colon polyps  He had 1 small tubular adenoma removed on colonoscopy this year.  Recommend repeat in 7 years.       Follow-up in 3 months    History of Present Illness     Milan Lal is  a 59 y.o. male who presents for follow-up of nausea, bloating, abdominal cramping, alternating constipation and diarrhea.    History obtained from: patient    He states that his symptoms are about the same compared to his last visit. He still has diarrhea at least once a week, multiple loose stools throughout the day. He does have normal formed stools in between, as well as occasional constipation. He denies any visible blood in the stool. He has made significant dietary changes and has lost about 15 pounds since his last visit. He has eliminated coffee, as well as spicy foods, onions, peppers. He is also not eating right before bedtime and sleeping with head of bed elevated.  He still has frequent nausea and vomiting, several days per week. This can occur with or without eating. He also reports epigastric cramping pain with or without eating. Sometimes the cramping is associated with bowel movements. He has significant heartburn and regurgitation despite omeprazole daily.     Objective   /70 (BP Location: Right arm, Patient Position: Sitting, Cuff Size: Adult)   Temp 98.4 °F (36.9 °C) (Tympanic)   Ht 6' (1.829 m)   Wt 107 kg (235 lb)   BMI 31.87 kg/m²      Physical Exam  Vitals and nursing note reviewed.   Constitutional:       General: He is not in acute distress.     Appearance: He is well-developed.   HENT:      Head: Normocephalic and atraumatic.   Eyes:      Conjunctiva/sclera: Conjunctivae normal.   Cardiovascular:      Rate and Rhythm: Normal rate and regular rhythm.      Heart sounds: No murmur heard.  Pulmonary:      Effort: Pulmonary effort is normal. No respiratory distress.      Breath sounds: Normal breath sounds.   Abdominal:      Palpations: Abdomen is soft.      Tenderness: There is no abdominal tenderness.   Musculoskeletal:         General: No swelling.      Cervical back: Neck supple.   Skin:     General: Skin is warm and dry.   Neurological:      Mental Status: He is alert.    Psychiatric:         Mood and Affect: Mood normal.

## 2024-12-12 NOTE — PATIENT INSTRUCTIONS
"Patient Education     Lactose intolerance   The Basics   Written by the doctors and editors at [facility]   What is lactose intolerance? -- Lactose intolerance is a condition that makes it hard for your body to digest milk and foods made with milk (called dairy products). If you have lactose intolerance and you eat dairy products, you can get diarrhea, belly pain, and gas.  Lactose intolerance can affect anyone. But it is most common among , , and Black people.  In people who do not have lactose intolerance, the body makes a protein called an \"enzyme\" that breaks down lactose, the main form of sugar found in milk. In people who do have lactose intolerance, the body either does not make enough of the enzyme, or the enzyme does not work as well as it should. Also, some infections, such as you might get with food poisoning, can damage the enzyme. But if that happens, the problem usually goes away within a few weeks. Luckily, people with lactose intolerance can take an enzyme supplement to help with their problem.  What are the symptoms of lactose intolerance? -- The symptoms happen only after you eat dairy foods. They can include:   Cramps or belly pain (usually around or below the belly button)   Bloating (feeling like your belly is full of air)   Gas   Diarrhea (often it is bulky, foamy, and watery)   Vomiting (this happens mostly in teens)  Is there a test for lactose intolerance? -- Yes. If you have symptoms that might be caused by lactose intolerance, there are 2 tests doctors can do. These can show if you are unable to digest lactose. One is a breathing test, and one is a blood test. The breathing test is more common.  Your doctor or nurse will tell you how to prepare for your test. You will not be able to eat or drink anything for several hours before the test. Plus, you might have to change your medicines or stop smoking for a while before the test.   Lactose hydrogen breath test - For this " "test, you drink a liquid that has lactose in it. Then you breathe into a special machine every 30 minutes. The machine measures how much hydrogen you breathe out. People who have lactose intolerance breathe out more hydrogen than normal.   Lactose tolerance test - For this test, you drink a liquid that has lactose in it. The doctor or nurse will take blood samples from you when the test starts, and again 1 and 2 hours later. If your blood has low levels of sugar after you drink the lactose, it means you probably have lactose intolerance.  Should I see a doctor or nurse? -- Yes. If you think you might have lactose intolerance, tell your doctor or nurse. They can ask you questions to make sure that there are no other problems.  How is lactose intolerance treated? -- Treatment differs depending on how severe the problem is. But in general, treatment can include:   Eating less dairy food   Finding non-dairy sources of nutrients (such as calcium and vitamin D) and protein   Taking an enzyme supplement that will help you break down dairy foods  How do I reduce the amount of dairy foods I eat? -- You can start by cutting down but not stopping foods you know contain dairy. Dairy foods should be consumed with meals. Dairy foods include milk, cream, ice cream, yogurt, cheese, and butter. This table shows how much lactose is in some common dairy foods (table 1).  Your doctor or nurse might suggest that you talk to a nutritionist to learn which foods have lactose. The nutritionist can also make sure that you get enough calcium and vitamin D in your diet.  If you are really sensitive to dairy foods or lactose, you will also need to read the labels on everything you eat. Milk or lactose is sometimes added to foods you might not suspect, such as cereal, instant soups, and salad dressings. Check the ingredient list of foods for anything that might suggest lactose. Look for these words:   Milk, \"milk byproducts,\" \"dry milk powder,\" " "and \"dry milk solids\"   Lactose   Whey (whey is milk that has gone sour)  Although some medicines are made with lactose, most people who are lactose intolerant can handle the very small amount in medicines.  Which enzyme supplement should I use? -- There are many enzyme supplements to choose from, including Lactaid (tablets or liquid), Lactrase, LactAce, Dairy Ease, and Lactrol. You should take the supplement right before you start eating. If you forget, you can take it during the meal, but it might not work as well.  The important thing to know is that each product works a bit differently for each person. Plus, none of them can break down every last bit of lactose, so some people still have symptoms even with an enzyme supplement.  Should I take calcium or vitamin D supplements? -- That depends on whether you completely avoid dairy foods. If you do, your doctor or nurse might recommend calcium supplements. They might also check your vitamin D levels to decide whether you should take supplements.  Is lactose intolerance the same as a food allergy? -- No. There are people who are allergic to milk and dairy foods. But the symptoms of a dairy allergy are often different from those of lactose intolerance. In the case of an allergy, the body reacts to the protein in milk, rather than to the sugar. Plus, allergies involve the body's infection-fighting system, called the immune system. Lactose intolerance does not.  All topics are updated as new evidence becomes available and our peer review process is complete.  This topic retrieved from [employer] on: Feb 26, 2024.  Topic 30348 Version 11.0  Release: 32.2.4 - C32.56  © 2024 UpToDate, Inc. and/or its affiliates. All rights reserved.  table 1: Lactose content of different foods  Product  Lactose content (grams)    Milk (1 cup)    Whole, 2%, 1%, skim 9 to 14   Buttermilk 9 to 12   Evaporated milk 24 to 28   Sweetened condensed milk 31 to 50   Lactaid milk (lactose-reduced) 3 "   Goat's milk 11 to 12   Acidophilus, skim 11   Yogurt, low fat, 1 cup  4 to 17   Cheese, 1 ounce    Cottage cheese (1/2 cup) 0.7 to 4   Cheddar (sharp) 0.4 to 0.6   Mozzarella (part skim, low moisture) 0.08 to 0.9   American (pasteurized, processed) 0.5 to 4   Ricotta (1/2 cup) 0.3 to 6   Cream cheese 0.1 to 0.8   Butter (1 pat)  0.04 to 0.5   Cream (1 tablespoon)    Light, whipping, sour 0.4 to 0.6   Ice cream (1/2 cup)  2 to 6   Ice milk (1/2 cup)  5   Sherbet (1/2 cup)  0.6 to 2   Graphic 70289 Version 7.0  Consumer Information Use and Disclaimer   Disclaimer: This generalized information is a limited summary of diagnosis, treatment, and/or medication information. It is not meant to be comprehensive and should be used as a tool to help the user understand and/or assess potential diagnostic and treatment options. It does NOT include all information about conditions, treatments, medications, side effects, or risks that may apply to a specific patient. It is not intended to be medical advice or a substitute for the medical advice, diagnosis, or treatment of a health care provider based on the health care provider's examination and assessment of a patient's specific and unique circumstances. Patients must speak with a health care provider for complete information about their health, medical questions, and treatment options, including any risks or benefits regarding use of medications. This information does not endorse any treatments or medications as safe, effective, or approved for treating a specific patient. UpToDate, Inc. and its affiliates disclaim any warranty or liability relating to this information or the use thereof.The use of this information is governed by the Terms of Use, available at https://www.wolterskluwer.com/en/know/clinical-effectiveness-terms. 2024© UpToDate, Inc. and its affiliates and/or licensors. All rights reserved.  Copyright   © 2024 UpToDate, Inc. and/or its affiliates. All rights  reserved.

## 2024-12-12 NOTE — ASSESSMENT & PLAN NOTE
I suspect his abdominal cramping and diarrhea with occasional constipation is related to IBS. Recent EGD/colonoscopy negative for celiac, IBD, microscopic colitis. Discussed trial of lactose-free diet for 4 to 6 weeks. Continue dicyclomine as needed for abdominal cramping and sent prescription for loperamide to take as needed for diarrhea. Would consider trial of rifaximin if symptoms persist.    Orders:    loperamide (IMODIUM) 2 mg capsule; Take 2 capsules (4 mg total) by mouth 4 (four) times a day as needed for diarrhea

## 2024-12-12 NOTE — ASSESSMENT & PLAN NOTE
Identified on recent EGD from July 2024. No evidence of dysplasia on biopsy. Patient has tried and failed omeprazole, pantoprazole, famotidine. He is interested in trying dexlansoprazole. Hopefully we can get this covered through his insurance. He has already made significant dietary changes.  Continue to avoid eating within 3 hours of bedtime and sleep with head of bed elevated at night. Plan for repeat EGD in 5 years for surveillance.    Orders:    dexlansoprazole (DEXILANT) 60 MG capsule; Take 1 capsule (60 mg total) by mouth daily in the early morning

## 2024-12-13 ENCOUNTER — TELEPHONE (OUTPATIENT)
Age: 59
End: 2024-12-13

## 2024-12-13 ENCOUNTER — TRANSCRIBE ORDERS (OUTPATIENT)
Dept: GASTROENTEROLOGY | Facility: CLINIC | Age: 59
End: 2024-12-13

## 2024-12-13 NOTE — TELEPHONE ENCOUNTER
PA for dexlansoprazole 60 mg      SUBMITTED to [employer]    via    [x]CMM-KEY: [id number]       All office notes, labs and other pertaining documents and studies sent. Clinical questions answered. Awaiting determination from insurance company.     Turnaround time for your insurance to make a decision on your Prior Authorization can take 7-21 business days.

## 2024-12-16 NOTE — TELEPHONE ENCOUNTER
PA for DEXALANSOPRAZOLE APPROVED     Date(s) approved 12/13/24-12/13/25    Case #    Patient advised by          [x]1stdibshart Message  []Phone call   []LMOM  []L/M to call office as no active Communication consent on file  [x]Unable to leave detailed message as VM not approved on Communication consent       Pharmacy advised by    [x]Fax  []Phone call    Approval letter scanned into Media Yes

## 2025-02-17 ENCOUNTER — HOSPITAL ENCOUNTER (EMERGENCY)
Facility: HOSPITAL | Age: 60
DRG: 641 | End: 2025-02-17
Attending: EMERGENCY MEDICINE | Admitting: EMERGENCY MEDICINE
Payer: MEDICARE

## 2025-02-17 ENCOUNTER — HOSPITAL ENCOUNTER (INPATIENT)
Facility: HOSPITAL | Age: 60
LOS: 3 days | Discharge: OTHER INSTITUTION | DRG: 641 | End: 2025-02-20
Attending: STUDENT IN AN ORGANIZED HEALTH CARE EDUCATION/TRAINING PROGRAM | Admitting: STUDENT IN AN ORGANIZED HEALTH CARE EDUCATION/TRAINING PROGRAM
Payer: MEDICARE

## 2025-02-17 VITALS
RESPIRATION RATE: 18 BRPM | TEMPERATURE: 97.5 F | SYSTOLIC BLOOD PRESSURE: 158 MMHG | HEART RATE: 96 BPM | OXYGEN SATURATION: 98 % | DIASTOLIC BLOOD PRESSURE: 73 MMHG

## 2025-02-17 DIAGNOSIS — Z13.9 ENCOUNTER FOR SCREENING INVOLVING SOCIAL DETERMINANTS OF HEALTH (SDOH): ICD-10-CM

## 2025-02-17 DIAGNOSIS — R10.9 ABDOMINAL CRAMPING: ICD-10-CM

## 2025-02-17 DIAGNOSIS — F10.90 ALCOHOL USE DISORDER: Primary | ICD-10-CM

## 2025-02-17 DIAGNOSIS — K22.70 BARRETT'S ESOPHAGUS WITHOUT DYSPLASIA: ICD-10-CM

## 2025-02-17 DIAGNOSIS — Z91.89 AT RISK FOR ALCOHOL WITHDRAWAL: Primary | ICD-10-CM

## 2025-02-17 DIAGNOSIS — G43.709 CHRONIC MIGRAINE WITHOUT AURA WITHOUT STATUS MIGRAINOSUS, NOT INTRACTABLE: ICD-10-CM

## 2025-02-17 DIAGNOSIS — E83.42 HYPOMAGNESEMIA: ICD-10-CM

## 2025-02-17 DIAGNOSIS — E87.6 HYPOKALEMIA: ICD-10-CM

## 2025-02-17 DIAGNOSIS — K58.0 IRRITABLE BOWEL SYNDROME WITH DIARRHEA: ICD-10-CM

## 2025-02-17 DIAGNOSIS — F41.9 ANXIETY: ICD-10-CM

## 2025-02-17 DIAGNOSIS — F10.229 ALCOHOL DEPENDENCE WITH INTOXICATION WITH COMPLICATION (HCC): ICD-10-CM

## 2025-02-17 DIAGNOSIS — F10.939 ALCOHOL WITHDRAWAL (HCC): ICD-10-CM

## 2025-02-17 PROBLEM — E66.811 CLASS 1 OBESITY DUE TO EXCESS CALORIES WITHOUT SERIOUS COMORBIDITY WITH BODY MASS INDEX (BMI) OF 31.0 TO 31.9 IN ADULT: Chronic | Status: ACTIVE | Noted: 2023-10-09

## 2025-02-17 PROBLEM — R06.89 BREATHING DIFFICULTY: Status: RESOLVED | Noted: 2018-08-21 | Resolved: 2025-02-17

## 2025-02-17 PROBLEM — E66.09 CLASS 1 OBESITY DUE TO EXCESS CALORIES WITHOUT SERIOUS COMORBIDITY WITH BODY MASS INDEX (BMI) OF 31.0 TO 31.9 IN ADULT: Chronic | Status: ACTIVE | Noted: 2023-10-09

## 2025-02-17 PROBLEM — S02.2XXB OPEN FRACTURE OF NASAL BONES: Status: RESOLVED | Noted: 2018-08-21 | Resolved: 2025-02-17

## 2025-02-17 PROBLEM — S02.40FA ZYGOMATIC FRACTURE, LEFT SIDE, INITIAL ENCOUNTER FOR CLOSED FRACTURE (HCC): Status: RESOLVED | Noted: 2018-08-21 | Resolved: 2025-02-17

## 2025-02-17 PROBLEM — M54.2 NECK PAIN: Status: RESOLVED | Noted: 2024-05-16 | Resolved: 2025-02-17

## 2025-02-17 PROBLEM — S02.2XXA NASAL SEPTUM FRACTURE, CLOSED, INITIAL ENCOUNTER: Status: RESOLVED | Noted: 2018-08-21 | Resolved: 2025-02-17

## 2025-02-17 PROBLEM — G40.909 SEIZURE DISORDER (HCC): Chronic | Status: ACTIVE | Noted: 2021-04-01

## 2025-02-17 PROBLEM — S02.2XXA NASAL BONES, CLOSED FRACTURE: Status: RESOLVED | Noted: 2018-08-21 | Resolved: 2025-02-17

## 2025-02-17 LAB
2HR DELTA HS TROPONIN: 0 NG/L
ALBUMIN SERPL BCG-MCNC: 4.1 G/DL (ref 3.5–5)
ALP SERPL-CCNC: 121 U/L (ref 34–104)
ALT SERPL W P-5'-P-CCNC: 33 U/L (ref 7–52)
ANION GAP SERPL CALCULATED.3IONS-SCNC: 12 MMOL/L (ref 4–13)
AST SERPL W P-5'-P-CCNC: 41 U/L (ref 13–39)
ATRIAL RATE: 110 BPM
BASOPHILS # BLD AUTO: 0.09 THOUSANDS/ΜL (ref 0–0.1)
BASOPHILS NFR BLD AUTO: 1 % (ref 0–1)
BILIRUB SERPL-MCNC: 0.85 MG/DL (ref 0.2–1)
BUN SERPL-MCNC: 19 MG/DL (ref 5–25)
CALCIUM SERPL-MCNC: 8.5 MG/DL (ref 8.4–10.2)
CARDIAC TROPONIN I PNL SERPL HS: 17 NG/L (ref ?–50)
CARDIAC TROPONIN I PNL SERPL HS: 17 NG/L (ref ?–50)
CHLORIDE SERPL-SCNC: 102 MMOL/L (ref 96–108)
CO2 SERPL-SCNC: 23 MMOL/L (ref 21–32)
CREAT SERPL-MCNC: 1.26 MG/DL (ref 0.6–1.3)
EOSINOPHIL # BLD AUTO: 0.15 THOUSAND/ΜL (ref 0–0.61)
EOSINOPHIL NFR BLD AUTO: 1 % (ref 0–6)
ERYTHROCYTE [DISTWIDTH] IN BLOOD BY AUTOMATED COUNT: 15.5 % (ref 11.6–15.1)
ETHANOL SERPL-MCNC: 39 MG/DL
GFR SERPL CREATININE-BSD FRML MDRD: 62 ML/MIN/1.73SQ M
GLUCOSE SERPL-MCNC: 76 MG/DL (ref 65–140)
HCT VFR BLD AUTO: 38.7 % (ref 36.5–49.3)
HGB BLD-MCNC: 13.3 G/DL (ref 12–17)
HIV 1+2 AB+HIV1 P24 AG SERPL QL IA: NORMAL
HIV1 P24 AG SER QL: NORMAL
IMM GRANULOCYTES # BLD AUTO: 0.07 THOUSAND/UL (ref 0–0.2)
IMM GRANULOCYTES NFR BLD AUTO: 1 % (ref 0–2)
LIPASE SERPL-CCNC: 21 U/L (ref 11–82)
LYMPHOCYTES # BLD AUTO: 1.85 THOUSANDS/ΜL (ref 0.6–4.47)
LYMPHOCYTES NFR BLD AUTO: 14 % (ref 14–44)
MAGNESIUM SERPL-MCNC: 1.3 MG/DL (ref 1.9–2.7)
MCH RBC QN AUTO: 32.4 PG (ref 26.8–34.3)
MCHC RBC AUTO-ENTMCNC: 34.4 G/DL (ref 31.4–37.4)
MCV RBC AUTO: 94 FL (ref 82–98)
MONOCYTES # BLD AUTO: 1.02 THOUSAND/ΜL (ref 0.17–1.22)
MONOCYTES NFR BLD AUTO: 7 % (ref 4–12)
NEUTROPHILS # BLD AUTO: 10.54 THOUSANDS/ΜL (ref 1.85–7.62)
NEUTS SEG NFR BLD AUTO: 76 % (ref 43–75)
NRBC BLD AUTO-RTO: 0 /100 WBCS
P AXIS: 44 DEGREES
PLATELET # BLD AUTO: 305 THOUSANDS/UL (ref 149–390)
PMV BLD AUTO: 9 FL (ref 8.9–12.7)
POTASSIUM SERPL-SCNC: 3.2 MMOL/L (ref 3.5–5.3)
PR INTERVAL: 170 MS
PROT SERPL-MCNC: 6.5 G/DL (ref 6.4–8.4)
QRS AXIS: 244 DEGREES
QRSD INTERVAL: 82 MS
QT INTERVAL: 350 MS
QTC INTERVAL: 474 MS
RBC # BLD AUTO: 4.1 MILLION/UL (ref 3.88–5.62)
SODIUM SERPL-SCNC: 137 MMOL/L (ref 135–147)
T WAVE AXIS: 28 DEGREES
VENTRICULAR RATE: 110 BPM
WBC # BLD AUTO: 13.72 THOUSAND/UL (ref 4.31–10.16)

## 2025-02-17 PROCEDURE — 36415 COLL VENOUS BLD VENIPUNCTURE: CPT

## 2025-02-17 PROCEDURE — 99285 EMERGENCY DEPT VISIT HI MDM: CPT | Performed by: EMERGENCY MEDICINE

## 2025-02-17 PROCEDURE — 83735 ASSAY OF MAGNESIUM: CPT

## 2025-02-17 PROCEDURE — 85025 COMPLETE CBC W/AUTO DIFF WBC: CPT

## 2025-02-17 PROCEDURE — 80053 COMPREHEN METABOLIC PANEL: CPT

## 2025-02-17 PROCEDURE — 96365 THER/PROPH/DIAG IV INF INIT: CPT

## 2025-02-17 PROCEDURE — 84484 ASSAY OF TROPONIN QUANT: CPT

## 2025-02-17 PROCEDURE — 96375 TX/PRO/DX INJ NEW DRUG ADDON: CPT

## 2025-02-17 PROCEDURE — 83690 ASSAY OF LIPASE: CPT

## 2025-02-17 PROCEDURE — 96376 TX/PRO/DX INJ SAME DRUG ADON: CPT

## 2025-02-17 PROCEDURE — 96367 TX/PROPH/DG ADDL SEQ IV INF: CPT

## 2025-02-17 PROCEDURE — 87806 HIV AG W/HIV1&2 ANTB W/OPTIC: CPT

## 2025-02-17 PROCEDURE — 86780 TREPONEMA PALLIDUM: CPT

## 2025-02-17 PROCEDURE — 82077 ASSAY SPEC XCP UR&BREATH IA: CPT

## 2025-02-17 PROCEDURE — 99285 EMERGENCY DEPT VISIT HI MDM: CPT

## 2025-02-17 PROCEDURE — 93005 ELECTROCARDIOGRAM TRACING: CPT

## 2025-02-17 PROCEDURE — 93010 ELECTROCARDIOGRAM REPORT: CPT | Performed by: INTERNAL MEDICINE

## 2025-02-17 RX ORDER — SODIUM CHLORIDE 9 MG/ML
125 INJECTION, SOLUTION INTRAVENOUS CONTINUOUS
Status: DISPENSED | OUTPATIENT
Start: 2025-02-18 | End: 2025-02-18

## 2025-02-17 RX ORDER — NICOTINE 21 MG/24HR
1 PATCH, TRANSDERMAL 24 HOURS TRANSDERMAL DAILY
Status: DISCONTINUED | OUTPATIENT
Start: 2025-02-18 | End: 2025-02-18

## 2025-02-17 RX ORDER — LANOLIN ALCOHOL/MO/W.PET/CERES
100 CREAM (GRAM) TOPICAL DAILY
Status: DISCONTINUED | OUTPATIENT
Start: 2025-02-18 | End: 2025-02-20 | Stop reason: HOSPADM

## 2025-02-17 RX ORDER — MAGNESIUM SULFATE HEPTAHYDRATE 40 MG/ML
2 INJECTION, SOLUTION INTRAVENOUS ONCE
Status: COMPLETED | OUTPATIENT
Start: 2025-02-17 | End: 2025-02-17

## 2025-02-17 RX ORDER — MAGNESIUM SULFATE HEPTAHYDRATE 40 MG/ML
2 INJECTION, SOLUTION INTRAVENOUS ONCE
Status: COMPLETED | OUTPATIENT
Start: 2025-02-18 | End: 2025-02-18

## 2025-02-17 RX ORDER — ONDANSETRON 2 MG/ML
4 INJECTION INTRAMUSCULAR; INTRAVENOUS ONCE
Status: COMPLETED | OUTPATIENT
Start: 2025-02-17 | End: 2025-02-17

## 2025-02-17 RX ORDER — HYDROXYZINE HYDROCHLORIDE 25 MG/1
25 TABLET, FILM COATED ORAL EVERY 6 HOURS PRN
Status: DISCONTINUED | OUTPATIENT
Start: 2025-02-17 | End: 2025-02-20 | Stop reason: HOSPADM

## 2025-02-17 RX ORDER — POTASSIUM CHLORIDE 1500 MG/1
40 TABLET, EXTENDED RELEASE ORAL ONCE
Status: COMPLETED | OUTPATIENT
Start: 2025-02-17 | End: 2025-02-17

## 2025-02-17 RX ORDER — ENOXAPARIN SODIUM 100 MG/ML
40 INJECTION SUBCUTANEOUS DAILY
Status: DISCONTINUED | OUTPATIENT
Start: 2025-02-18 | End: 2025-02-20 | Stop reason: HOSPADM

## 2025-02-17 RX ORDER — GABAPENTIN 300 MG/1
300 CAPSULE ORAL EVERY 8 HOURS PRN
Status: DISCONTINUED | OUTPATIENT
Start: 2025-02-17 | End: 2025-02-20 | Stop reason: HOSPADM

## 2025-02-17 RX ORDER — LIDOCAINE 50 MG/G
1 PATCH TOPICAL ONCE
Status: DISCONTINUED | OUTPATIENT
Start: 2025-02-17 | End: 2025-02-17 | Stop reason: HOSPADM

## 2025-02-17 RX ORDER — ACETAMINOPHEN 325 MG/1
650 TABLET ORAL EVERY 6 HOURS PRN
Status: DISCONTINUED | OUTPATIENT
Start: 2025-02-17 | End: 2025-02-20 | Stop reason: HOSPADM

## 2025-02-17 RX ORDER — FOLIC ACID 1 MG/1
1 TABLET ORAL DAILY
Status: DISCONTINUED | OUTPATIENT
Start: 2025-02-18 | End: 2025-02-20 | Stop reason: HOSPADM

## 2025-02-17 RX ORDER — ONDANSETRON 2 MG/ML
4 INJECTION INTRAMUSCULAR; INTRAVENOUS EVERY 6 HOURS PRN
Status: DISCONTINUED | OUTPATIENT
Start: 2025-02-17 | End: 2025-02-19

## 2025-02-17 RX ORDER — CLONIDINE HYDROCHLORIDE 0.1 MG/1
0.1 TABLET ORAL EVERY 8 HOURS PRN
Status: DISCONTINUED | OUTPATIENT
Start: 2025-02-17 | End: 2025-02-20 | Stop reason: HOSPADM

## 2025-02-17 RX ADMIN — PHENOBARBITAL SODIUM 260 MG: 65 INJECTION INTRAMUSCULAR at 23:14

## 2025-02-17 RX ADMIN — POTASSIUM CHLORIDE 40 MEQ: 1500 TABLET, EXTENDED RELEASE ORAL at 21:52

## 2025-02-17 RX ADMIN — ONDANSETRON 4 MG: 2 INJECTION INTRAMUSCULAR; INTRAVENOUS at 20:53

## 2025-02-17 RX ADMIN — MAGNESIUM SULFATE HEPTAHYDRATE 2 G: 40 INJECTION, SOLUTION INTRAVENOUS at 21:51

## 2025-02-17 RX ADMIN — LIDOCAINE 1 PATCH: 700 PATCH TOPICAL at 21:41

## 2025-02-17 RX ADMIN — PHENOBARBITAL SODIUM 260 MG: 65 INJECTION INTRAMUSCULAR at 20:58

## 2025-02-18 PROBLEM — Z91.89: Status: ACTIVE | Noted: 2025-02-18

## 2025-02-18 PROBLEM — Z72.0 VAPES NICOTINE CONTAINING SUBSTANCE: Status: ACTIVE | Noted: 2025-02-18

## 2025-02-18 LAB
ALBUMIN SERPL BCG-MCNC: 3.5 G/DL (ref 3.5–5)
ALP SERPL-CCNC: 107 U/L (ref 34–104)
ALT SERPL W P-5'-P-CCNC: 26 U/L (ref 7–52)
ANION GAP SERPL CALCULATED.3IONS-SCNC: 11 MMOL/L (ref 4–13)
AST SERPL W P-5'-P-CCNC: 33 U/L (ref 13–39)
BILIRUB SERPL-MCNC: 0.99 MG/DL (ref 0.2–1)
BUN SERPL-MCNC: 15 MG/DL (ref 5–25)
CALCIUM SERPL-MCNC: 7.9 MG/DL (ref 8.4–10.2)
CHLORIDE SERPL-SCNC: 105 MMOL/L (ref 96–108)
CO2 SERPL-SCNC: 23 MMOL/L (ref 21–32)
CREAT SERPL-MCNC: 0.97 MG/DL (ref 0.6–1.3)
ERYTHROCYTE [DISTWIDTH] IN BLOOD BY AUTOMATED COUNT: 15.7 % (ref 11.6–15.1)
GFR SERPL CREATININE-BSD FRML MDRD: 85 ML/MIN/1.73SQ M
GLUCOSE SERPL-MCNC: 79 MG/DL (ref 65–140)
HCT VFR BLD AUTO: 38.1 % (ref 36.5–49.3)
HGB BLD-MCNC: 12.7 G/DL (ref 12–17)
MAGNESIUM SERPL-MCNC: 2.5 MG/DL (ref 1.9–2.7)
MCH RBC QN AUTO: 32 PG (ref 26.8–34.3)
MCHC RBC AUTO-ENTMCNC: 33.3 G/DL (ref 31.4–37.4)
MCV RBC AUTO: 96 FL (ref 82–98)
PHOSPHATE SERPL-MCNC: 3.4 MG/DL (ref 2.7–4.5)
PLATELET # BLD AUTO: 283 THOUSANDS/UL (ref 149–390)
PMV BLD AUTO: 9.1 FL (ref 8.9–12.7)
POTASSIUM SERPL-SCNC: 3.2 MMOL/L (ref 3.5–5.3)
PROT SERPL-MCNC: 5.9 G/DL (ref 6.4–8.4)
RBC # BLD AUTO: 3.97 MILLION/UL (ref 3.88–5.62)
SODIUM SERPL-SCNC: 139 MMOL/L (ref 135–147)
TREPONEMA PALLIDUM IGG+IGM AB [PRESENCE] IN SERUM OR PLASMA BY IMMUNOASSAY: NORMAL
WBC # BLD AUTO: 10.63 THOUSAND/UL (ref 4.31–10.16)

## 2025-02-18 PROCEDURE — HZ2ZZZZ DETOXIFICATION SERVICES FOR SUBSTANCE ABUSE TREATMENT: ICD-10-PCS | Performed by: STUDENT IN AN ORGANIZED HEALTH CARE EDUCATION/TRAINING PROGRAM

## 2025-02-18 PROCEDURE — 83735 ASSAY OF MAGNESIUM: CPT | Performed by: PHYSICIAN ASSISTANT

## 2025-02-18 PROCEDURE — 99223 1ST HOSP IP/OBS HIGH 75: CPT | Performed by: STUDENT IN AN ORGANIZED HEALTH CARE EDUCATION/TRAINING PROGRAM

## 2025-02-18 PROCEDURE — 84100 ASSAY OF PHOSPHORUS: CPT | Performed by: PHYSICIAN ASSISTANT

## 2025-02-18 PROCEDURE — 80053 COMPREHEN METABOLIC PANEL: CPT | Performed by: PHYSICIAN ASSISTANT

## 2025-02-18 PROCEDURE — 85027 COMPLETE CBC AUTOMATED: CPT | Performed by: PHYSICIAN ASSISTANT

## 2025-02-18 PROCEDURE — 99222 1ST HOSP IP/OBS MODERATE 55: CPT | Performed by: EMERGENCY MEDICINE

## 2025-02-18 RX ORDER — PHENOBARBITAL 64.8 MG/1
64.8 TABLET ORAL ONCE
Status: COMPLETED | OUTPATIENT
Start: 2025-02-18 | End: 2025-02-18

## 2025-02-18 RX ORDER — LOPERAMIDE HYDROCHLORIDE 2 MG/1
4 CAPSULE ORAL 4 TIMES DAILY PRN
Status: DISCONTINUED | OUTPATIENT
Start: 2025-02-18 | End: 2025-02-20 | Stop reason: HOSPADM

## 2025-02-18 RX ORDER — CYCLOBENZAPRINE HCL 5 MG
5 TABLET ORAL 3 TIMES DAILY PRN
Status: DISCONTINUED | OUTPATIENT
Start: 2025-02-18 | End: 2025-02-20 | Stop reason: HOSPADM

## 2025-02-18 RX ORDER — NALTREXONE HYDROCHLORIDE 50 MG/1
25 TABLET, FILM COATED ORAL ONCE
Status: COMPLETED | OUTPATIENT
Start: 2025-02-18 | End: 2025-02-18

## 2025-02-18 RX ORDER — NICOTINE 21 MG/24HR
1 PATCH, TRANSDERMAL 24 HOURS TRANSDERMAL DAILY
Status: DISCONTINUED | OUTPATIENT
Start: 2025-02-18 | End: 2025-02-20 | Stop reason: HOSPADM

## 2025-02-18 RX ORDER — ALBUTEROL SULFATE 90 UG/1
2 INHALANT RESPIRATORY (INHALATION) EVERY 6 HOURS PRN
Status: DISCONTINUED | OUTPATIENT
Start: 2025-02-18 | End: 2025-02-20 | Stop reason: HOSPADM

## 2025-02-18 RX ORDER — POTASSIUM CHLORIDE 1500 MG/1
40 TABLET, EXTENDED RELEASE ORAL ONCE
Status: COMPLETED | OUTPATIENT
Start: 2025-02-18 | End: 2025-02-18

## 2025-02-18 RX ORDER — ESCITALOPRAM OXALATE 10 MG/1
20 TABLET ORAL DAILY
Status: DISCONTINUED | OUTPATIENT
Start: 2025-02-19 | End: 2025-02-20 | Stop reason: HOSPADM

## 2025-02-18 RX ORDER — TOPIRAMATE 25 MG/1
50 TABLET, FILM COATED ORAL 2 TIMES DAILY
Status: DISCONTINUED | OUTPATIENT
Start: 2025-02-18 | End: 2025-02-20 | Stop reason: HOSPADM

## 2025-02-18 RX ORDER — PANTOPRAZOLE SODIUM 40 MG/1
40 TABLET, DELAYED RELEASE ORAL
Status: DISCONTINUED | OUTPATIENT
Start: 2025-02-19 | End: 2025-02-20 | Stop reason: HOSPADM

## 2025-02-18 RX ORDER — DICYCLOMINE HCL 20 MG
20 TABLET ORAL EVERY 6 HOURS PRN
Status: DISCONTINUED | OUTPATIENT
Start: 2025-02-18 | End: 2025-02-20 | Stop reason: HOSPADM

## 2025-02-18 RX ORDER — PHENOBARBITAL SODIUM 130 MG/ML
130 INJECTION, SOLUTION INTRAMUSCULAR; INTRAVENOUS ONCE
Status: COMPLETED | OUTPATIENT
Start: 2025-02-18 | End: 2025-02-18

## 2025-02-18 RX ORDER — DIAZEPAM 10 MG/2ML
10 INJECTION, SOLUTION INTRAMUSCULAR; INTRAVENOUS ONCE
Status: COMPLETED | OUTPATIENT
Start: 2025-02-18 | End: 2025-02-18

## 2025-02-18 RX ADMIN — DICYCLOMINE HYDROCHLORIDE 20 MG: 20 TABLET ORAL at 23:56

## 2025-02-18 RX ADMIN — SODIUM CHLORIDE 125 ML/HR: 0.9 INJECTION, SOLUTION INTRAVENOUS at 10:07

## 2025-02-18 RX ADMIN — ACETAMINOPHEN 650 MG: 325 TABLET, FILM COATED ORAL at 19:42

## 2025-02-18 RX ADMIN — SODIUM CHLORIDE 125 ML/HR: 0.9 INJECTION, SOLUTION INTRAVENOUS at 00:46

## 2025-02-18 RX ADMIN — PHENOBARBITAL 64.8 MG: 64.8 TABLET ORAL at 16:35

## 2025-02-18 RX ADMIN — Medication 1 PATCH: at 01:08

## 2025-02-18 RX ADMIN — PHENOBARBITAL 64.8 MG: 64.8 TABLET ORAL at 09:21

## 2025-02-18 RX ADMIN — GABAPENTIN 300 MG: 300 CAPSULE ORAL at 13:16

## 2025-02-18 RX ADMIN — TOPIRAMATE 50 MG: 25 TABLET, FILM COATED ORAL at 17:26

## 2025-02-18 RX ADMIN — GABAPENTIN 300 MG: 300 CAPSULE ORAL at 02:26

## 2025-02-18 RX ADMIN — ENOXAPARIN SODIUM 40 MG: 40 INJECTION SUBCUTANEOUS at 09:08

## 2025-02-18 RX ADMIN — PHENOBARBITAL SODIUM 130 MG: 130 INJECTION INTRAMUSCULAR; INTRAVENOUS at 06:01

## 2025-02-18 RX ADMIN — PHENOBARBITAL 64.8 MG: 64.8 TABLET ORAL at 19:42

## 2025-02-18 RX ADMIN — DICYCLOMINE HYDROCHLORIDE 20 MG: 20 TABLET ORAL at 16:35

## 2025-02-18 RX ADMIN — DIAZEPAM 10 MG: 5 INJECTION INTRAMUSCULAR; INTRAVENOUS at 00:51

## 2025-02-18 RX ADMIN — ACETAMINOPHEN 650 MG: 325 TABLET, FILM COATED ORAL at 00:44

## 2025-02-18 RX ADMIN — NALTREXONE HYDROCHLORIDE 25 MG: 50 TABLET, FILM COATED ORAL at 13:16

## 2025-02-18 RX ADMIN — ONDANSETRON 4 MG: 2 INJECTION INTRAMUSCULAR; INTRAVENOUS at 13:18

## 2025-02-18 RX ADMIN — HYDROXYZINE HYDROCHLORIDE 25 MG: 25 TABLET ORAL at 13:16

## 2025-02-18 RX ADMIN — PHENOBARBITAL 64.8 MG: 64.8 TABLET ORAL at 02:26

## 2025-02-18 RX ADMIN — HYDROXYZINE HYDROCHLORIDE 25 MG: 25 TABLET ORAL at 06:01

## 2025-02-18 RX ADMIN — HYDROXYZINE HYDROCHLORIDE 25 MG: 25 TABLET ORAL at 19:42

## 2025-02-18 RX ADMIN — CLONIDINE HYDROCHLORIDE 0.1 MG: 0.1 TABLET ORAL at 02:26

## 2025-02-18 RX ADMIN — Medication 650 MG: at 00:51

## 2025-02-18 RX ADMIN — MULTIPLE VITAMINS W/ MINERALS TAB 1 TABLET: TAB ORAL at 09:09

## 2025-02-18 RX ADMIN — MAGNESIUM SULFATE HEPTAHYDRATE 2 G: 40 INJECTION, SOLUTION INTRAVENOUS at 01:29

## 2025-02-18 RX ADMIN — POTASSIUM CHLORIDE 40 MEQ: 1500 TABLET, EXTENDED RELEASE ORAL at 10:05

## 2025-02-18 RX ADMIN — Medication 1 PATCH: at 09:09

## 2025-02-18 RX ADMIN — THIAMINE HCL TAB 100 MG 100 MG: 100 TAB at 09:09

## 2025-02-18 RX ADMIN — FOLIC ACID 1 MG: 1 TABLET ORAL at 09:09

## 2025-02-18 NOTE — PLAN OF CARE
Problem: SUBSTANCE USE/ABUSE  Goal: By discharge, will develop insight into their chemical dependency and sustain motivation to continue in recovery  Description: INTERVENTIONS:  - Attends all daily group sessions and scheduled AA groups  - Actively practices coping skills through participation in the therapeutic community and adherence to program rules  - Reviews and completes assignments from individual treatment plan  - Assist patient development of understanding of their personal cycle of addiction and relapse triggers  Outcome: Progressing     Problem: CARDIOVASCULAR - ADULT  Goal: Maintains optimal cardiac output and hemodynamic stability  Description: INTERVENTIONS:  - Monitor I/O, vital signs and rhythm  - Monitor for S/S and trends of decreased cardiac output  - Administer and titrate ordered vasoactive medications to optimize hemodynamic stability  - Assess quality of pulses, skin color and temperature  - Assess for signs of decreased coronary artery perfusion  - Instruct patient to report change in severity of symptoms  Outcome: Progressing     Problem: CARDIOVASCULAR - ADULT  Goal: Absence of cardiac dysrhythmias or at baseline rhythm  Description: INTERVENTIONS:  - Continuous cardiac monitoring, vital signs, obtain 12 lead EKG if ordered  - Administer antiarrhythmic and heart rate control medications as ordered  - Monitor electrolytes and administer replacement therapy as ordered  Outcome: Progressing     Problem: METABOLIC, FLUID AND ELECTROLYTES - ADULT  Goal: Electrolytes maintained within normal limits  Description: INTERVENTIONS:  - Monitor labs and assess patient for signs and symptoms of electrolyte imbalances  - Administer electrolyte replacement as ordered  - Monitor response to electrolyte replacements, including repeat lab results as appropriate  - Instruct patient on fluid and nutrition as appropriate  Outcome: Progressing
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
## 2025-02-18 NOTE — ASSESSMENT & PLAN NOTE
Initiate MELVIN's protocol with symptom triggered dosing of phenobarbital  Do not exceed more than 2000 mg of phenobarbital within the entirety of the hospital stay  Patient has received approximately 1170 mg of phenobarbital thus far  Discussed initiation of naltrexone  Patient agreeable to starting naltrexone and denies any opioid or supplementation use including kratom

## 2025-02-18 NOTE — ED ATTENDING ATTESTATION
2/17/2025  I, Remi Rivera MD, saw and evaluated the patient. I have discussed the patient with the resident/non-physician practitioner and agree with the resident's/non-physician practitioner's findings, Plan of Care, and MDM as documented in the resident's/non-physician practitioner's note, except where noted. All available labs and Radiology studies were reviewed.  I was present for key portions of any procedure(s) performed by the resident/non-physician practitioner and I was immediately available to provide assistance.       At this point I agree with the current assessment done in the Emergency Department.  I have conducted an independent evaluation of this patient a history and physical is as follows:    ED Course     Patient presents for concerns due to alcohol dependence/withdrawal. Patient reports that he has been drinking increasing amounts of alcohol daily.  He states that currently, he has been drinking a handle of vodka and twelve 50 mL bottles of liquor a day.  He states that he has a support group and was attempting to stop drinking on his own.  He had not had a drink this morning but was starting to have significant withdrawal symptoms and decided to take 6 shots of alcohol to help with his symptoms prior to coming into the hospital.  He states that he is starting to have his symptoms return.  Patient states that he has stopped drinking for several weeks in the past and does not believe he has ever had a seizure.  Patient is requesting detox to help him with his symptoms.  Exam: Awake, alert, shaky, NAD, tachycardic, CTA, upper abdominal tenderness. A/P: Alcohol dependence and withdrawal.  Given reported amount of alcohol usage, will plan to start with phenobarb to help with symptoms.  Will reach out to the detox unit in an attempt to have patient transferred.  Will also check some abdominal and cardiac labs to rule out any other underlying problems.    Critical Care Time  Procedures
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
## 2025-02-18 NOTE — ASSESSMENT & PLAN NOTE
Initiate thiamine, folate, daily multivitamin  Consult CRS for recovery support  Consult case management for disposition planning  Patient states he is interested in rehabilitation programs after completing detox

## 2025-02-18 NOTE — CERTIFIED RECOVERY SPECIALIST
Certified  Note    Patient name: Milan Lal  Location: 5T DETOX 513/5T DETOX 51*  [place]: [facility]  Attending:  Randy Aleman MD MRN [id number]  : 1965  Age: 60 y.o.    Sex: male Date 2025         Substance Use History:     Social History     Substance and Sexual Activity   Alcohol Use Yes    Alcohol/week: 21.0 standard drinks of alcohol    Types: 21 Cans of beer per week        Social History     Substance and Sexual Activity   Drug Use Not Currently    Types: Marijuana     ***               Chely Staples
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
## 2025-02-18 NOTE — ASSESSMENT & PLAN NOTE
Patient presented requesting Detox. Reports drinking 1/2 G vodka/day + shooters. Tried to stop drinking on Monday and started with withdrawal symptoms and took 5 shoots around 5 pm and presented to [facility] ED.  Patient was noted to be anxious, had tremors, diaphoretic, tachycardic was given 260 mg phenobarbital  SEWS protocol  Appreciate toxicology input

## 2025-02-18 NOTE — EMTALA/ACUTE CARE TRANSFER
[facility] EMERGENCY DEPARTMENT  [address] [address]  Dept: [phone]      EMTALA TRANSFER CONSENT    NAME Milan SPANN 1965                              MRN [id number]    I have been informed of my rights regarding examination, treatment, and transfer   by Dr. Remi Rivera MD    Benefits: Specialized equipment and/or services available at the receiving facility (Include comment)________________________ (Medical detox unit)    Risks: Potential for delay in receiving treatment, Potential deterioration of medical condition, Increased discomfort during transfer, Possible worsening of condition or death during transfer      Consent for Transfer:  I acknowledge that my medical condition has been evaluated and explained to me by the emergency department physician or other qualified medical person and/or my attending physician, who has recommended that I be transferred to the service of  Accepting Physician: Dr. Randy Aleman at Accepting Facility Name, City & State : [facility]. The above potential benefits of such transfer, the potential risks associated with such transfer, and the probable risks of not being transferred have been explained to me, and I fully understand them.  The doctor has explained that, in my case, the benefits of transfer outweigh the risks.  I agree to be transferred.    I authorize the performance of emergency medical procedures and treatments upon me in both transit and upon arrival at the receiving facility.  Additionally, I authorize the release of any and all medical records to the receiving facility and request they be transported with me, if possible.  I understand that the safest mode of transportation during a medical emergency is an ambulance and that the Hospital advocates the use of this mode of transport. Risks of traveling to the receiving facility by car, including absence of medical  control, life sustaining equipment, such as oxygen, and medical personnel has been explained to me and I fully understand them.    (FE CORRECT BOX BELOW)  [  ]  I consent to the stated transfer and to be transported by ambulance/helicopter.  [  ]  I consent to the stated transfer, but refuse transportation by ambulance and accept full responsibility for my transportation by car.  I understand the risks of non-ambulance transfers and I exonerate the Hospital and its staff from any deterioration in my condition that results from this refusal.    X___________________________________________    DATE  25  TIME________  Signature of patient or legally responsible individual signing on patient behalf           RELATIONSHIP TO PATIENT_________________________          Provider Certification    NAME Milan Lal                                        [facility] 1965                              MRN [id number]    A medical screening exam was performed on the above named patient.  Based on the examination:    Condition Necessitating Transfer The primary encounter diagnosis was Alcohol use disorder. Diagnoses of Alcohol withdrawal (HCC), Hypokalemia, and Hypomagnesemia were also pertinent to this visit.    Patient Condition: The patient has been stabilized such that within reasonable medical probability, no material deterioration of the patient condition or the condition of the unborn child(tien) is likely to result from the transfer    Reason for Transfer: Other (Include comment)____________________ (Specialty not available)    Transfer Requirements: Facility [facility]   Space available and qualified personnel available for treatment as acknowledged by    Agreed to accept transfer and to provide appropriate medical treatment as acknowledged by       Dr. Randy Aleman  Appropriate medical records of the examination and treatment of the patient are provided at the time of transfer   STAFF INITIAL WHEN COMPLETED  _______  Transfer will be performed by qualified personnel from    and appropriate transfer equipment as required, including the use of necessary and appropriate life support measures.    Provider Certification: I have examined the patient and explained the following risks and benefits of being transferred/refusing transfer to the patient/family:         Based on these reasonable risks and benefits to the patient and/or the unborn child(tien), and based upon the information available at the time of the patient’s examination, I certify that the medical benefits reasonably to be expected from the provision of appropriate medical treatments at another medical facility outweigh the increasing risks, if any, to the individual’s medical condition, and in the case of labor to the unborn child, from effecting the transfer.    X____________________________________________ DATE 02/17/25        TIME_______      ORIGINAL - SEND TO MEDICAL RECORDS   COPY - SEND WITH PATIENT DURING TRANSFER

## 2025-02-18 NOTE — ASSESSMENT & PLAN NOTE
Patient drinking 1/2 G Vodka/day + shooters with heavy use for past 6 months- year.   MVI/Thiamine/folic acid  CRS and CM consulted for support and resources

## 2025-02-18 NOTE — ED NOTES
Patient has three bags labeled with patient name. 1 blue and black bag containing various toiletries. 2 backpacks containing: sandals, multiple t-shirts, multiple shorts, pants, personal paperwork, multiple pairs of socks, underwear, personal keys, towel, bible, and a vape.      Bay Cummings  02/17/25 4640

## 2025-02-18 NOTE — ASSESSMENT & PLAN NOTE
Discussed nicotine replacement therapy  Patient agreeable to nicotine patch  Can replace patch as needed  Counseled on health risks and quitting

## 2025-02-18 NOTE — UTILIZATION REVIEW
"Initial Clinical Review    Pt initially presented to [facility] ED. Pt was transferred by EMS to [facility] for its Level IV medically managed intensive inpatient detox unit, not available at [facility].    Admission: Date/Time/Statement:   Admission Orders (From admission, onward)       Ordered        02/17/25 2353  Inpatient Admission  Once                          Orders Placed This Encounter   Procedures    Inpatient Admission     Standing Status:   Standing     Number of Occurrences:   1     Level of Care:   Med Surg [16]     Estimated length of stay:   More than 2 Midnights     Certification:   I certify that inpatient services are medically necessary for this patient for a duration of greater than two midnights. See H&P and MD Progress Notes for additional information about the patient's course of treatment.       Initial Presentation: 60 y.o. male who presented to [facility]. Inpatient admission for evaluation and treatment of alcohol withdrawal syndrome. Presented w/ diaphoresis, nausea, and chest pain. Abd tenderness. Exam: diaphoretic, tachycardia, Abd epigastric tenderness, anxious.  Plan: Phenobarc, CIWA, IVF, telemetry, continuous pulse ox, continue PTA meds, trend labs, replete electrolytes as needed; I&O, fall & seizure precautions. Toxicology consulted. PMH: anxiety, depression, seizure disorder, h/o cocaine and opioids over a decade ago, bipolar, asthma, HTN.  Labs - elevated AST, alk phos, low Mag, leukocytosis.      Anticipated Length of Stay: Patient will be admitted on an inpatient basis with an anticipated length of stay of greater than 2 midnights secondary to electrolyte replacement, support for etoh withdrawal, specialist input.     Toxicology: Presented w/ need for detox from alcohol. Serum ETOH: 39. Reports ottle of Vodka + 12 \"99 shooter\" bottles daily, last drink on 2/17 @ 1700. Has no  " prior rehab treatment for withdrawal. Reports no hx of withdrawal seizures. On exam, slight tremor, tenderness lower abd. SEWS 14. Plan: SEWS monitoring w/ phenobarbital management, PO thiamine/folic acid supplement.       Date: 2/18       Day 2:   ETOH withdrawal, AUD, electrolyte imbalance - Pt reports interest in Naltrexone.  On exam, mild horizontal nystagmus, BLE trace pitting edema, sensory deficit LLE.  . SEWS 8 then 0. Plan: continue SEWS monitoring w/ phenobarbital management, PO thiamine/folic acid supplement, telemetry, continuous pulse ox, continue current meds, trend labs, replete electrolytes as needed. Received 1364.8 mg phenobarbital since admission. No abd or epigastric pain today.  Downtrending WBC, K down to 3.2. IV fluids d/c today.  Using PRN meds.  Will start Naltrexone on 2/19.     Date: 2/19  Day 3:   ETOH withdrawal, AUD, electrolyte imbalance  - started on IM Naltrexone, continue MVI< Thiamine, folic acid, K repletion with 40 mEQ.  Pt reports abd cramping today along with migraine, nausea, vomiting, abdominal pain, significant anxiety. Tolerating diet, n o N/V.  Normal breath sounds on exam.  Had Phenobarb 130 mg today.  Per Toxicology, symptoms now unlikely r/t withdrawal.  Consider using antidopaminergic agents including haloperidol, droperidol, metoclopramide for refractory headache/nausea/abdominal pain.       Scheduled Medications:  enoxaparin, 40 mg, Subcutaneous, Daily  escitalopram, 20 mg, Oral, Daily  folic acid, 1 mg, Oral, Daily  multivitamin-minerals, 1 tablet, Oral, Daily  nicotine, 1 patch, Transdermal, Daily  pantoprazole, 40 mg, Oral, Early Morning  thiamine, 100 mg, Oral, Daily  topiramate, 50 mg, Oral, BID      Continuous IV Infusions:  sodium chloride, 125 mL/hr, Intravenous, Continuous - d/c 2/18      PRN Meds:  acetaminophen, 650 mg, Oral, Q6H PRN - x 2 2/18  cloNIDine, 0.1 mg, Oral, Q8H PRN - x 1 2/18  gabapentin, 300 mg, Oral, Q8H PRN - x 2 2/18  hydrOXYzine HCL, 25  mg, Oral, Q6H PRN - x 3 2/18  ondansetron, 4 mg, Intravenous, Q6H PRN  Bentyl 20 mg PO X 2 2/18. X 1 2/19  IV Reglan x 1 2/19  IV Zofran x 1 2/18      diazepam, 10 mg, Intravenous; x1  phenobarbital, 650 mg, Intravenous; x1  phenobarbital, 260 mg, Intravenous; x2  phenobarbital, 130 mg, Intravenous; x1  phenobarbital, 65 mg, Intravenous; x0  phenobarbital, 64.8 mg, Oral; x2    ED Triage Vitals   Temperature Pulse Respirations Blood Pressure SpO2 Pain Score   02/17/25 2350 02/17/25 2350 02/17/25 2350 02/17/25 2350 02/17/25 2350 02/17/25 2351   97.7 °F (36.5 °C) 96 18 157/99 96 % 7     Weight (last 2 days)       Date/Time Weight    02/17/25 2350 107 (236.11)              Vital Signs (last 3 days)       Date/Time Temp Pulse Resp BP MAP (mmHg) SpO2 Calculated FIO2 (%) - Nasal Cannula Nasal Cannula O2 Flow Rate (L/min) O2 Device Patient Position - Orthostatic VS Michael Coma Scale Score SEWS Total Score Pain    02/19/25 1051 -- -- -- -- -- -- -- -- -- -- -- -- 7    02/19/25 0738 98 °F (36.7 °C) 72 16 119/69 85 97 % -- -- None (Room air) Lying -- -- No Pain    02/19/25 0548 97 °F (36.1 °C) 69 16 130/77 -- 92 % -- -- None (Room air) Lying -- -- --    02/19/25 0454 -- -- -- -- -- -- -- -- -- -- -- 8 --    02/19/25 0450 97.6 °F (36.4 °C) 66 18 123/79 -- 97 % -- -- None (Room air) Lying -- -- --    02/19/25 0000 97.7 °F (36.5 °C) 73 16 151/84 -- 97 % -- -- None (Room air) -- -- 0 --    02/18/25 2100 97.9 °F (36.6 °C) 68 16 138/76 -- 95 % -- -- None (Room air) -- -- 0 --    02/18/25 1959 -- -- -- -- -- -- -- -- -- -- 15 -- --    02/18/25 1942 -- -- -- -- -- -- -- -- -- -- -- -- 6    02/18/25 1935 97 °F (36.1 °C) 75 19 144/82 -- 90 % -- -- None (Room air) Lying -- 6 --    02/18/25 1710 -- -- -- -- -- -- -- -- -- -- -- 0 --    02/18/25 1606 -- -- -- -- -- -- -- -- -- -- -- 6 --    02/18/25 1313 -- -- -- -- -- -- -- -- -- -- -- 3 --    02/18/25 0914 -- -- -- -- -- -- -- -- -- -- -- 5 --    02/18/25 0715 97.4 °F (36.3 °C) 65 18  131/74 93 98 % 26 1.5 L/min Nasal cannula Lying -- -- --    02/18/25 0630 -- 72 18 -- -- 95 % 26 1.5 L/min Nasal cannula -- -- 0 --    02/18/25 0530 -- -- -- -- -- -- -- -- -- -- -- 8 --    02/18/25 0511 97.5 °F (36.4 °C) 74 17 126/78 94 97 % -- -- None (Room air) Lying -- -- --    02/18/25 0325 -- 81 -- -- -- 90 % 26 1.5 L/min Nasal cannula -- -- 0 --    02/18/25 0215 -- -- -- -- -- -- -- -- -- -- -- 5 --    02/18/25 0210 97.8 °F (36.6 °C) 88 18 142/85 104 94 % -- -- None (Room air) Lying -- -- --    02/18/25 0152 -- -- -- -- -- 94 % 26 1.5 L/min Nasal cannula -- -- -- --    02/18/25 0150 -- -- -- -- -- 85 % -- -- None (Room air) -- -- -- --    02/18/25 0120 98.2 °F (36.8 °C) 86 18 143/87 105 92 % -- -- None (Room air) Lying -- 0 --    02/18/25 0044 -- -- -- -- -- -- -- -- -- -- -- -- 7    02/18/25 0010 -- -- -- -- -- -- -- -- -- -- 15 14 --    02/17/25 2351 -- -- -- -- -- -- -- -- -- -- -- -- 7 02/17/25 2350 97.7 °F (36.5 °C) 96 18 157/99 118 96 % -- -- None (Room air) Sitting -- -- --            SEWS:   Row Name 02/18/25 1313 02/18/25 0914 02/18/25 0630 02/18/25 0530 02/18/25 0325   Severity of Ethanol Withdrawal Scale (SEWS)   ANXIETY: Do you feel that something bad is about to happen to you right now? 3  -YS 3  -YS 0  -MB 3  -MB 0  -MB   NAUSEA and DRY HEAVES or VOMITING? 0  -YS 0  -YS 0  -MB 3  -MB 0  -MB   SWEATING: (includes moist palms, sweating now)? Score 0 or 2 0  -YS 0  -YS 0  -MB 0  -MB 0  -MB   TREMOR: with arms extended eyes closed? 0  -YS 2  -YS 0  -MB 2  -MB 0  -MB   AGITATION: Fidgety, restless, pacing? 0  -YS 0  -YS 0  -MB 0  -MB 0  -MB   DISORIENTATION: 0  -YS 0  -YS 0  -MB 0  -MB 0  -MB   HALLUCINATIONS: 0  -YS 0  -YS 0  -MB 0  -MB 0  -MB   VITAL SIGNS: ANY (Pulse >110, Diastolic BP >90, Temp >99.6) 0  -YS 0  -YS 0  -MB 0  -MB 0  -MB   SEWS Total Score 3  -YS 5  -YS 0  -MB 8  -MB 0  -MB   Armstrong Agitation Sedation Scale (RASS)   Armstrong Agitation Sedation Scale (RASS) 0  -YS 0  -YS -1   -MB -1  -MB -1  -MB   Row Name 02/18/25 0215 02/18/25 0127 02/18/25 0120 02/18/25 0010    Severity of Ethanol Withdrawal Scale (SEWS)   ANXIETY: Do you feel that something bad is about to happen to you right now? 0  -MB --  -MB 0  -MB 3  -MB    NAUSEA and DRY HEAVES or VOMITING? 3  -MB --  -MB 0  -MB 3  -MB    SWEATING: (includes moist palms, sweating now)? Score 0 or 2 0  -MB --  -MB 0  -MB 0  -MB    TREMOR: with arms extended eyes closed? 2  -MB --  -MB 0  -MB 2  -MB    AGITATION: Fidgety, restless, pacing? 0  -MB --  -MB 0  -MB 3  -MB    DISORIENTATION: 0  -MB --  -MB 0  -MB 0  -MB    HALLUCINATIONS: 0  -MB --  -MB 0  -MB 0  -MB    VITAL SIGNS: ANY (Pulse >110, Diastolic BP >90, Temp >99.6) 0  -MB --  -MB 0  -MB 3  -MB    SEWS Total Score 5  -MB --  -MB 0  -MB 14  -MB        Pertinent Labs/Diagnostic Test Results:   Radiology:  No orders to display     Cardiology:  No orders to display     GI:  No orders to display         Results from last 7 days   Lab Units 02/19/25 0453 02/18/25  0521 02/17/25  2049   WBC Thousand/uL 7.70 10.63* 13.72*   HEMOGLOBIN g/dL 11.9* 12.7 13.3   HEMATOCRIT % 35.6* 38.1 38.7   PLATELETS Thousands/uL 254 283 305   TOTAL NEUT ABS Thousands/µL  --   --  10.54*         Results from last 7 days   Lab Units 02/19/25 0453 02/18/25  0521 02/17/25  2049   SODIUM mmol/L 138 139 137   POTASSIUM mmol/L 3.4* 3.2* 3.2*   CHLORIDE mmol/L 106 105 102   CO2 mmol/L 25 23 23   ANION GAP mmol/L 7 11 12   BUN mg/dL 12 15 19   CREATININE mg/dL 0.90 0.97 1.26   EGFR ml/min/1.73sq m 93 85 62   CALCIUM mg/dL 7.8* 7.9* 8.5   MAGNESIUM mg/dL  --  2.5 1.3*   PHOSPHORUS mg/dL  --  3.4  --      Results from last 7 days   Lab Units 02/18/25  0521 02/17/25  2049   AST U/L 33 41*   ALT U/L 26 33   ALK PHOS U/L 107* 121*   TOTAL PROTEIN g/dL 5.9* 6.5   ALBUMIN g/dL 3.5 4.1   TOTAL BILIRUBIN mg/dL 0.99 0.85         Results from last 7 days   Lab Units 02/19/25  0453 02/18/25  0521 02/17/25  2049   GLUCOSE RANDOM mg/dL  85 79 76           Results from last 7 days   Lab Units 02/17/25 2138 02/17/25 2049   HS TNI 0HR ng/L  --  17   HS TNI 2HR ng/L 17  --    HSTNI D2 ng/L 0  --        Results from last 7 days   Lab Units 02/17/25  2049   LIPASE u/L 21               Results from last 7 days   Lab Units 02/17/25 2049   ETHANOL LVL mg/dL 39*         Past Medical History:   Diagnosis Date    Anxiety     Asthma     Basal cell carcinoma 04/26/2023    RIGHT CHEEK; MOHS    Hypertension     Open fracture of nasal bones 08/21/2018    Added automatically from request for surgery [id number]      PONV (postoperative nausea and vomiting)     Seizures (HCC)     Zygomatic fracture, left side, initial encounter for closed fracture (HCC) 08/21/2018     Present on Admission:   Hypomagnesemia   Alcohol use disorder, severe, dependence (HCC)   Hoffmann's esophagus without dysplasia   Seizure disorder (HCC)   Hypokalemia   At risk for alcohol withdrawal   Vapes nicotine containing substance      Admitting Diagnosis: S/P alcohol detoxification [Z92.89]  Age/Sex: 60 y.o. male      SEWS PHENOBARBITAL PROTOCOL FOR ALCOHOL WITHDRAWAL  Admit patient to medical detox unit and continue supportive care and stabilization of acute ethanol withdrawal per medical toxicology/detox treatment pathway. Monitor ethanol withdrawal severity via the Severity of Ethanol Withdrawal Scale (SEWS) Q4 hours and then hourly if/when SEWS > 6. Treat withdrawal per pathway and reassess Q30-60 minutes.          Mild SEWS Score 1-6  Administer medications* (IV or PO; PO preferred):  If initial SEWS score: diazepam 10mg PO/IV x 1 AND phenobarbital 65 mg PO/IV x 1  If repeat SEWS score 1-6: phenobarbital 65 mg PO/IV q1 hour x 5 doses maximum   Reassessment:   SEWS q1 hour after each dose until SEWS 0 x 2 hours  VS q1 hours (until SEWS 0, then q4 hours)  Notify provider for bedside evaluation if 5-dose maximum is reached, RASS of -3 to -5, or SEWS score escalates to moderate or severe.    Moderate SEWS Score 7-12  Administer medications* (IV):  If initial SEWS score: diazepam 10mg IV x 1 AND phenobarbital 260 mg IV x 1  If repeat SEWS score 7-12 or score escalated from mild: phenobarbital 130 mg IV q30 minutes x 5 doses maximum   Reassessment:  SEWS q30 minutes after each dose until SEWS < 7 (then hourly until SEWS 0 x 2 hours)  VS q30 minutes until SEWS < 7 (then hourly until SEWS 0, then q4 hours)  Notify provider for bedside evaluation if 5-dose maximum is reached, RASS of -3 to -5, or SEWS score escalates to severe.   Severe SEWS Score >= 13  Administer medications* (IV):  If initial SEWS score: Diazepam 10 mg IV x 1 AND phenobarbital 650 mg IV piggyback x 1 over 15-30 minutes  If repeat SEWS score >= 13 or score escalated from mild or moderate: phenobarbital 130 mg IV q30 minutes x 5 doses maximum   Reassessment:  SEWS q30 minutes after each dose until SEWS < 7 (then hourly until SEWS 0 x 2 hours)   VS q30 minutes until SEWS < 7 (then hourly until SEWS 0, then q4 hours)  Notify provider for bedside evaluation if 5-dose maximum is reached or RASS of -3 to -5   *Hold medications and notify provider if CNS depression, respirations < 10/min, or RASS of -3 to -5.        Network Utilization Review Department  ATTENTION: Please call with any questions or concerns to [phone] and carefully listen to the prompts so that you are directed to the right person. All voicemails are confidential.   For Discharge needs, contact Care Management DC Support Team at [phone] opt. 2  Send all requests for admission clinical reviews, approved or denied determinations and any other requests to dedicated fax number below belonging to the campus where the patient is receiving treatment. List of dedicated fax numbers for the Facilities:  FACILITY NAME UR FAX NUMBER   ADMISSION DENIALS (Administrative/Medical Necessity) [phone]   DISCHARGE SUPPORT TEAM (NETWORK) [phone]   [facility]  (Maternity/NICU/Pediatrics) [phone]   [facility] [phone]   [facility] [phone]   [facility] [phone]   [facility] [phone]   [facility] [phone]   [facility] [phone]   General acute hospital [phone]   [facility] [phone]   [facility] [phone]   [facility] [phone]   [facility] [phone]   [facility] [phone]

## 2025-02-19 PROBLEM — G43.909 MIGRAINE: Status: ACTIVE | Noted: 2025-02-19

## 2025-02-19 LAB
ANION GAP SERPL CALCULATED.3IONS-SCNC: 7 MMOL/L (ref 4–13)
BUN SERPL-MCNC: 12 MG/DL (ref 5–25)
CALCIUM SERPL-MCNC: 7.8 MG/DL (ref 8.4–10.2)
CHLORIDE SERPL-SCNC: 106 MMOL/L (ref 96–108)
CO2 SERPL-SCNC: 25 MMOL/L (ref 21–32)
CREAT SERPL-MCNC: 0.9 MG/DL (ref 0.6–1.3)
ERYTHROCYTE [DISTWIDTH] IN BLOOD BY AUTOMATED COUNT: 15.8 % (ref 11.6–15.1)
GFR SERPL CREATININE-BSD FRML MDRD: 93 ML/MIN/1.73SQ M
GLUCOSE SERPL-MCNC: 85 MG/DL (ref 65–140)
HCT VFR BLD AUTO: 35.6 % (ref 36.5–49.3)
HGB BLD-MCNC: 11.9 G/DL (ref 12–17)
MCH RBC QN AUTO: 32 PG (ref 26.8–34.3)
MCHC RBC AUTO-ENTMCNC: 33.4 G/DL (ref 31.4–37.4)
MCV RBC AUTO: 96 FL (ref 82–98)
PLATELET # BLD AUTO: 254 THOUSANDS/UL (ref 149–390)
PMV BLD AUTO: 9.1 FL (ref 8.9–12.7)
POTASSIUM SERPL-SCNC: 3.4 MMOL/L (ref 3.5–5.3)
RBC # BLD AUTO: 3.72 MILLION/UL (ref 3.88–5.62)
SODIUM SERPL-SCNC: 138 MMOL/L (ref 135–147)
WBC # BLD AUTO: 7.7 THOUSAND/UL (ref 4.31–10.16)

## 2025-02-19 PROCEDURE — 99233 SBSQ HOSP IP/OBS HIGH 50: CPT | Performed by: EMERGENCY MEDICINE

## 2025-02-19 PROCEDURE — 99232 SBSQ HOSP IP/OBS MODERATE 35: CPT | Performed by: STUDENT IN AN ORGANIZED HEALTH CARE EDUCATION/TRAINING PROGRAM

## 2025-02-19 PROCEDURE — 80048 BASIC METABOLIC PNL TOTAL CA: CPT | Performed by: STUDENT IN AN ORGANIZED HEALTH CARE EDUCATION/TRAINING PROGRAM

## 2025-02-19 PROCEDURE — 85027 COMPLETE CBC AUTOMATED: CPT | Performed by: STUDENT IN AN ORGANIZED HEALTH CARE EDUCATION/TRAINING PROGRAM

## 2025-02-19 RX ORDER — PHENOBARBITAL SODIUM 130 MG/ML
130 INJECTION, SOLUTION INTRAMUSCULAR; INTRAVENOUS ONCE
Status: COMPLETED | OUTPATIENT
Start: 2025-02-19 | End: 2025-02-19

## 2025-02-19 RX ORDER — METOCLOPRAMIDE HYDROCHLORIDE 5 MG/ML
10 INJECTION INTRAMUSCULAR; INTRAVENOUS EVERY 6 HOURS PRN
Status: DISCONTINUED | OUTPATIENT
Start: 2025-02-19 | End: 2025-02-20 | Stop reason: HOSPADM

## 2025-02-19 RX ORDER — POTASSIUM CHLORIDE 1500 MG/1
40 TABLET, EXTENDED RELEASE ORAL ONCE
Status: COMPLETED | OUTPATIENT
Start: 2025-02-19 | End: 2025-02-19

## 2025-02-19 RX ORDER — SUMATRIPTAN SUCCINATE 25 MG/1
100 TABLET ORAL ONCE AS NEEDED
Status: COMPLETED | OUTPATIENT
Start: 2025-02-19 | End: 2025-02-19

## 2025-02-19 RX ADMIN — DICYCLOMINE HYDROCHLORIDE 20 MG: 20 TABLET ORAL at 08:30

## 2025-02-19 RX ADMIN — ESCITALOPRAM OXALATE 20 MG: 10 TABLET ORAL at 08:26

## 2025-02-19 RX ADMIN — MULTIPLE VITAMINS W/ MINERALS TAB 1 TABLET: TAB ORAL at 08:26

## 2025-02-19 RX ADMIN — POTASSIUM CHLORIDE 40 MEQ: 1500 TABLET, EXTENDED RELEASE ORAL at 09:29

## 2025-02-19 RX ADMIN — METOCLOPRAMIDE 10 MG: 5 INJECTION, SOLUTION INTRAMUSCULAR; INTRAVENOUS at 10:53

## 2025-02-19 RX ADMIN — PHENOBARBITAL SODIUM 130 MG: 130 INJECTION INTRAMUSCULAR; INTRAVENOUS at 05:11

## 2025-02-19 RX ADMIN — ENOXAPARIN SODIUM 40 MG: 40 INJECTION SUBCUTANEOUS at 08:26

## 2025-02-19 RX ADMIN — PANTOPRAZOLE SODIUM 40 MG: 40 TABLET, DELAYED RELEASE ORAL at 05:11

## 2025-02-19 RX ADMIN — TOPIRAMATE 50 MG: 25 TABLET, FILM COATED ORAL at 18:02

## 2025-02-19 RX ADMIN — SUMATRIPTAN SUCCINATE 100 MG: 25 TABLET ORAL at 10:51

## 2025-02-19 RX ADMIN — THIAMINE HCL TAB 100 MG 100 MG: 100 TAB at 08:26

## 2025-02-19 RX ADMIN — ACETAMINOPHEN 650 MG: 325 TABLET, FILM COATED ORAL at 19:18

## 2025-02-19 RX ADMIN — NALTREXONE 380 MG: KIT at 13:24

## 2025-02-19 RX ADMIN — TOPIRAMATE 50 MG: 25 TABLET, FILM COATED ORAL at 08:27

## 2025-02-19 RX ADMIN — Medication 1 PATCH: at 08:27

## 2025-02-19 RX ADMIN — FOLIC ACID 1 MG: 1 TABLET ORAL at 08:26

## 2025-02-19 NOTE — ASSESSMENT & PLAN NOTE
Patient drinking 1/2 G Vodka/day + shooters with heavy use for past 6 months- year.   No reports of alcohol withdrawal seizures  MVI/Thiamine/folic acid  CRS and CM consulted for support and resources

## 2025-02-19 NOTE — PLAN OF CARE
Problem: PAIN - ADULT  Goal: Verbalizes/displays adequate comfort level or baseline comfort level  Description: Interventions:  - Encourage patient to monitor pain and request assistance  - Assess pain using appropriate pain scale  - Administer analgesics based on type and severity of pain and evaluate response  - Implement non-pharmacological measures as appropriate and evaluate response  - Consider cultural and social influences on pain and pain management  - Notify physician/advanced practitioner if interventions unsuccessful or patient reports new pain  Outcome: Progressing     Problem: DISCHARGE PLANNING  Goal: Discharge to home or other facility with appropriate resources  Description: INTERVENTIONS:  - Identify barriers to discharge w/patient and caregiver  - Arrange for needed discharge resources and transportation as appropriate  - Identify discharge learning needs (meds, wound care, etc.)  - Arrange for interpretive services to assist at discharge as needed  - Refer to Case Management Department for coordinating discharge planning if the patient needs post-hospital services based on physician/advanced practitioner order or complex needs related to functional status, cognitive ability, or social support system  Outcome: Progressing     Problem: Knowledge Deficit  Goal: Patient/family/caregiver demonstrates understanding of disease process, treatment plan, medications, and discharge instructions  Description: Complete learning assessment and assess knowledge base.  Interventions:  - Provide teaching at level of understanding  - Provide teaching via preferred learning methods  Outcome: Progressing     Problem: SAFETY ADULT  Goal: Patient will remain free of falls  Description: INTERVENTIONS:  - Educate patient/family on patient safety including physical limitations  - Instruct patient to call for assistance with activity   - Consult OT/PT to assist with strengthening/mobility   - Keep Call bell within  reach  - Keep bed low and locked with side rails adjusted as appropriate  - Keep care items and personal belongings within reach  - Initiate and maintain comfort rounds  - Make Fall Risk Sign visible to staff  - Offer Toileting every 2 Hours, in advance of need  - Obtain necessary fall risk management equipment:   - Apply yellow socks and bracelet for high fall risk patients  - Consider moving patient to room near nurses station  Outcome: Progressing

## 2025-02-19 NOTE — PROGRESS NOTES
Progress Note - Medical Toxicology   Name: Milan Lal 60 y.o. male I MRN: [id number]  Unit/Bed#: 5T DETOX 513-01 I Date of Admission: 2/17/2025   Date of Service: 2/19/2025 I Hospital Day: 2    Assessment & Plan  At risk for alcohol withdrawal  Initiate CIWA's protocol with symptom triggered dosing of phenobarbital  Do not exceed more than 2000 mg of phenobarbital within the entirety of the hospital stay  Patient has received approximately 1170 mg of phenobarbital thus far  Discussed initiation of naltrexone  Patient agreeable to starting naltrexone and denies any opioid or supplementation use including kratom  Alcohol use disorder, severe, dependence (HCC)  Continue thiamine, folate, daily multivitamin  Consult CRS for recovery support  Consult case management for disposition planning  Patient states he is interested in inpatient rehabilitation and he has been talking with case management/CRS about the logistics  Patient given 25 mg naltrexone yesterday and tolerated well  Can give Vivitrol IM injection 380 mg today  Patient has self-reported history of anxiety and depression-may need medication adjustments so Psychiatric evaluation outpatient is warranted  Vapes nicotine containing substance  Discussed nicotine replacement therapy  Patient agreeable to nicotine patch  Can replace patch as needed  Counseled on health risks and quitting  Seizure disorder (HCC)  Continue home seizure prophylaxis  SEWS symptom triggered phenobarbital dosing    Reason for current consult: Alcohol use disorder     Subjective   Patient states his tremors have improved but he has been experiencing some nausea, vomiting, and anxiety.  Patient states he had 1 episode of vomiting yesterday and has noticed some stomach cramping this morning.  Patient has self-reported history of moderate to severe anxiety that is worsened when he is not drinking.  Otherwise, patient has no other complaints at this time.  Patient feels well taking naltrexone  and is excited about going to inpatient rehabilitation after discharge.    Objective :  Temp:  [97 °F (36.1 °C)-98 °F (36.7 °C)] 98 °F (36.7 °C)  HR:  [66-75] 72  BP: (119-151)/(69-84) 119/69  Resp:  [16-19] 16  SpO2:  [90 %-97 %] 97 %  O2 Device: None (Room air)      Intake/Output Summary (Last 24 hours) at 2/19/2025 1040  Last data filed at 2/18/2025 1300  Gross per 24 hour   Intake 240 ml   Output --   Net 240 ml       Physical Exam  Vitals and nursing note reviewed.   Constitutional:       General: He is not in acute distress.     Appearance: He is well-developed.   HENT:      Head: Normocephalic and atraumatic.      Nose: Nose normal.      Mouth/Throat:      Mouth: Mucous membranes are moist.   Eyes:      General: No scleral icterus.        Right eye: No discharge.         Left eye: No discharge.      Extraocular Movements: Extraocular movements intact.      Conjunctiva/sclera: Conjunctivae normal.      Pupils: Pupils are equal, round, and reactive to light.   Cardiovascular:      Rate and Rhythm: Normal rate and regular rhythm.      Heart sounds: No murmur heard.  Pulmonary:      Effort: Pulmonary effort is normal. No respiratory distress.      Breath sounds: Normal breath sounds. No wheezing, rhonchi or rales.   Abdominal:      General: There is no distension.      Palpations: Abdomen is soft.      Tenderness: There is abdominal tenderness. There is no guarding.   Musculoskeletal:         General: No swelling.      Cervical back: Neck supple.      Right lower leg: Edema present.      Left lower leg: Edema present.      Comments: Trace edema bilaterally   Skin:     General: Skin is warm and dry.      Capillary Refill: Capillary refill takes less than 2 seconds.   Neurological:      General: No focal deficit present.      Mental Status: He is alert and oriented to person, place, and time. Mental status is at baseline.      Sensory: No sensory deficit.      Motor: No weakness.   Psychiatric:         Mood and  Affect: Mood normal.         Behavior: Behavior normal.         Thought Content: Thought content normal.         Judgment: Judgment normal.           Lab Results: I have reviewed the following results:CBC/BMP:   .     02/19/25  0453   WBC 7.70   HGB 11.9*   HCT 35.6*      SODIUM 138   K 3.4*      CO2 25   BUN 12   CREATININE 0.90   GLUC 85        Imaging Results Review: No pertinent imaging studies reviewed.  Other Study Results Review: No additional pertinent studies reviewed.  Patient states    Administrative Statements   I have spent a total time of 25 minutes in caring for this patient on the day of the visit/encounter including Prognosis, Instructions for management, Patient and family education, Impressions, Counseling / Coordination of care, Documenting in the medical record, Reviewing/placing orders in the medical record (including tests, medications, and/or procedures), Obtaining or reviewing history  , and Communicating with other healthcare professionals .   PAST MEDICAL HISTORY:  Afib     Alcohol abuse     Atrial fibrillation     Diabetes mellitus of other type without complication     HLD (hyperlipidemia)     HTN (hypertension)     Hypothyroidism     Hypothyroidism     Schizophrenia     Schizophrenia

## 2025-02-19 NOTE — ASSESSMENT & PLAN NOTE
Patient reports vapes frequently on daily basis  Nicotine patch 14 mg   Smoking cessation recommended

## 2025-02-19 NOTE — ASSESSMENT & PLAN NOTE
Currently not on antiseizure medications, no reports of seizures for over 5 years  Currently following with neurology regarding migraines

## 2025-02-19 NOTE — PLAN OF CARE
Problem: PAIN - ADULT  Goal: Verbalizes/displays adequate comfort level or baseline comfort level  Description: Interventions:  - Encourage patient to monitor pain and request assistance  - Assess pain using appropriate pain scale  - Administer analgesics based on type and severity of pain and evaluate response  - Implement non-pharmacological measures as appropriate and evaluate response  - Consider cultural and social influences on pain and pain management  - Notify physician/advanced practitioner if interventions unsuccessful or patient reports new pain  Outcome: Progressing     Problem: SAFETY ADULT  Goal: Patient will remain free of falls  Description: INTERVENTIONS:  - Educate patient/family on patient safety including physical limitations  - Instruct patient to call for assistance with activity   - Consult OT/PT to assist with strengthening/mobility   - Keep Call bell within reach  - Keep bed low and locked with side rails adjusted as appropriate  - Keep care items and personal belongings within reach  - Initiate and maintain comfort rounds  - Make Fall Risk Sign visible to staff  - Apply yellow socks and bracelet for high fall risk patients  - Consider moving patient to room near nurses station  Outcome: Progressing  Goal: Maintain or return to baseline ADL function  Description: INTERVENTIONS:  -  Assess patient's ability to carry out ADLs; assess patient's baseline for ADL function and identify physical deficits which impact ability to perform ADLs (bathing, care of mouth/teeth, toileting, grooming, dressing, etc.)  - Assess/evaluate cause of self-care deficits   - Assess range of motion  - Assess patient's mobility; develop plan if impaired  - Assess patient's need for assistive devices and provide as appropriate  - Encourage maximum independence but intervene and supervise when necessary  - Involve family in performance of ADLs  - Assess for home care needs following discharge   - Consider OT consult  to assist with ADL evaluation and planning for discharge  - Provide patient education as appropriate  Outcome: Progressing  Goal: Maintains/Returns to pre admission functional level  Description: INTERVENTIONS:  - Perform AM-PAC 6 Click Basic Mobility/ Daily Activity assessment daily.  - Set and communicate daily mobility goal to care team and patient/family/caregiver.   - Collaborate with rehabilitation services on mobility goals if consulted  - Out of bed for toileting  - Record patient progress and toleration of activity level   Outcome: Progressing     Problem: SUBSTANCE USE/ABUSE  Goal: By discharge, will develop insight into their chemical dependency and sustain motivation to continue in recovery  Description: INTERVENTIONS:  - Attends all daily group sessions and scheduled AA groups  - Actively practices coping skills through participation in the therapeutic community and adherence to program rules  - Reviews and completes assignments from individual treatment plan  - Assist patient development of understanding of their personal cycle of addiction and relapse triggers  Outcome: Progressing  Goal: By discharge, patient will have ongoing treatment plan addressing chemical dependency  Description: INTERVENTIONS:  - Assist patient with resources and/or appointments for ongoing recovery based living  Outcome: Progressing     Problem: METABOLIC, FLUID AND ELECTROLYTES - ADULT  Goal: Electrolytes maintained within normal limits  Description: INTERVENTIONS:  - Monitor labs and assess patient for signs and symptoms of electrolyte imbalances  - Administer electrolyte replacement as ordered  - Monitor response to electrolyte replacements, including repeat lab results as appropriate  - Instruct patient on fluid and nutrition as appropriate  Outcome: Progressing

## 2025-02-19 NOTE — ASSESSMENT & PLAN NOTE
Initiate MELVIN's protocol with symptom triggered dosing of phenobarbital  Do not exceed more than 2000 mg of phenobarbital within the entirety of the hospital stay  Patient has received approximately 1170 mg of phenobarbital thus far  Discussed initiation of naltrexone  Patient agreeable to starting naltrexone and denies any opioid or supplementation use including kratom   No

## 2025-02-19 NOTE — PROGRESS NOTES
Progress Note - Hospitalist   Name: Milan Lal 60 y.o. male I MRN: [id number]  Unit/Bed#: 5T DETOX 513-01 I Date of Admission: 2/17/2025   Date of Service: 2/19/2025 I Hospital Day: 2     Assessment & Plan  At risk for alcohol withdrawal  Patient presented requesting Detox. Reports drinking 1/2 G vodka/day + shooters. Tried to stop drinking on Monday and started with withdrawal symptoms and took 5 shoots around 5 pm and presented to [facility] ED.  Patient was noted to be anxious, had tremors, diaphoretic, tachycardic  SEWS protocol with phenobarbital. Received 1170mg of phenobarbital  Appreciate toxicology input  Started on naltrexone 380MG IM  CM following for further resources  Alcohol use disorder, severe, dependence (HCC)  Patient drinking 1/2 G Vodka/day + shooters with heavy use for past 6 months- year.   No reports of alcohol withdrawal seizures  MVI/Thiamine/folic acid  CRS and CM consulted for support and resources  Hypomagnesemia  Repleted  Hypokalemia  Will give additional kdur 40meq, repeat BMP in am  Seizure disorder (HCC)  Currently not on antiseizure medications, no reports of seizures for over 5 years  Currently following with neurology regarding migraines  Class 1 obesity due to excess calories without serious comorbidity with body mass index (BMI) of 31.0 to 31.9 in adult  BMI 31  Healthy diet and lifestyle modification recommended  Hoffmann's esophagus without dysplasia  Continue PPI  Bentyl PRN  Vapes nicotine containing substance  Patient reports vapes frequently on daily basis  Nicotine patch 14 mg   Smoking cessation recommended  Migraine  Continue Topamax 25 mg twice daily with triptan as needed as needed    VTE Pharmacologic Prophylaxis:   Pharmacologic: Enoxaparin (Lovenox)   Mechanical VTE Prophylaxis in Place: Yes    Current Length of Stay: 2 day(s)    Current Patient Status: Inpatient   Certification Statement: The patient will continue to require additional inpatient hospital stay due to  withdrawal mgmt    Discharge Plan: pending    Code Status: Level 1 - Full Code      Subjective:   Patient reporting abdominal cramps today, has been able to tolerate diet.  Denies any nausea or vomiting.  Was able to get some rest overnight.     Objective:     Vitals:   Temp (24hrs), Av.5 °F (36.4 °C), Min:97 °F (36.1 °C), Max:98 °F (36.7 °C)    Temp:  [97 °F (36.1 °C)-98 °F (36.7 °C)] 98 °F (36.7 °C)  HR:  [66-75] 72  Resp:  [16-19] 16  BP: (119-151)/(69-84) 119/69  SpO2:  [90 %-97 %] 97 %  Body mass index is 32.02 kg/m².     Input and Output Summary (last 24 hours):     No intake or output data in the 24 hours ending 25 1417    Physical Exam:     Physical Exam  Vitals and nursing note reviewed.   Constitutional:       Appearance: He is obese.   Cardiovascular:      Rate and Rhythm: Normal rate.      Heart sounds: Normal heart sounds.   Pulmonary:      Breath sounds: Normal breath sounds. No wheezing.   Abdominal:      General: Bowel sounds are normal. There is no distension.      Palpations: Abdomen is soft.   Musculoskeletal:         General: No swelling.      Right lower leg: No edema.      Left lower leg: No edema.   Skin:     General: Skin is warm and dry.   Neurological:      General: No focal deficit present.      Mental Status: He is alert. Mental status is at baseline.         Additional Data:     Labs:    Results from last 7 days   Lab Units 25  0521 25  2049   WBC Thousand/uL 7.70   < > 13.72*   HEMOGLOBIN g/dL 11.9*   < > 13.3   HEMATOCRIT % 35.6*   < > 38.7   PLATELETS Thousands/uL 254   < > 305   SEGS PCT %  --   --  76*   LYMPHO PCT %  --   --  14   MONO PCT %  --   --  7   EOS PCT %  --   --  1    < > = values in this interval not displayed.     Results from last 7 days   Lab Units 25  0453 02/18/25  0521   SODIUM mmol/L 138 139   POTASSIUM mmol/L 3.4* 3.2*   CHLORIDE mmol/L 106 105   CO2 mmol/L 25 23   BUN mg/dL 12 15   CREATININE mg/dL 0.90 0.97   ANION  GAP mmol/L 7 11   CALCIUM mg/dL 7.8* 7.9*   ALBUMIN g/dL  --  3.5   TOTAL BILIRUBIN mg/dL  --  0.99   ALK PHOS U/L  --  107*   ALT U/L  --  26   AST U/L  --  33   GLUCOSE RANDOM mg/dL 85 79                           * I Have Reviewed All Lab Data Listed Above.  * Additional Pertinent Lab Tests Reviewed: All Labs For Current Hospital Admission Reviewed    Mobility:  Basic Mobility Inpatient Raw Score: 24  -[facility] Goal: 8: Walk 250 feet or more  -[facility] Achieved: 7: Walk 25 feet or more    Lines:     Invasive Devices       Peripheral Intravenous Line  Duration             Peripheral IV 02/17/25 Right;Ventral (anterior) Forearm 1 day                       Imaging:    Imaging Reports Reviewed Today Include:     No results found.      Recent Cultures (last 7 days):           Last 24 Hours Medication List:   Current Facility-Administered Medications   Medication Dose Route Frequency Provider Last Rate    acetaminophen  650 mg Oral Q6H PRN Tierra Kathleen PA-C      albuterol  2 puff Inhalation Q6H PRN Randy Aleman MD      cloNIDine  0.1 mg Oral Q8H PRN Tierra Kathleen PA-C      cyclobenzaprine  5 mg Oral TID PRN Randy Aleman MD      dicyclomine  20 mg Oral Q6H PRN Randy Aleman MD      enoxaparin  40 mg Subcutaneous Daily Tierra Kathleen PA-C      escitalopram  20 mg Oral Daily Randy Aleman MD      folic acid  1 mg Oral Daily Tierra Kathleen PA-C      gabapentin  300 mg Oral Q8H PRN Tierra Kathleen PA-C      hydrOXYzine HCL  25 mg Oral Q6H PRN Tierra Kathleen PA-C      loperamide  4 mg Oral 4x Daily PRN Randy Aleman MD      metoclopramide  10 mg Intravenous Q6H PRN Randy Aleman MD      multivitamin-minerals  1 tablet Oral Daily Tierra Kathleen PA-C      nicotine  1 patch Transdermal Daily Tierra Kathleen PA-C      pantoprazole  40 mg Oral Early Morning Randy Aleman MD      thiamine  100 mg Oral Daily Tierra Kathleen PA-C       topiramate  50 mg Oral BID Randy Aleman MD          Today, Patient Was Seen By: Randy Aleman MD    ** Please Note: Dictation voice to text software may have been used in the creation of this document. **

## 2025-02-19 NOTE — ASSESSMENT & PLAN NOTE
Patient presented requesting Detox. Reports drinking 1/2 G vodka/day + shooters. Tried to stop drinking on Monday and started with withdrawal symptoms and took 5 shoots around 5 pm and presented to [facility] ED.  Patient was noted to be anxious, had tremors, diaphoretic, tachycardic  SEWS protocol with phenobarbital. Received 1170mg of phenobarbital  Appreciate toxicology input  Started on naltrexone 380MG IM  CM following for further resources
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
## 2025-02-19 NOTE — ASSESSMENT & PLAN NOTE
Continue thiamine, folate, daily multivitamin  Consult CRS for recovery support  Consult case management for disposition planning  Patient states he is interested in inpatient rehabilitation and he has been talking with case management/CRS about the logistics  Patient given 25 mg naltrexone yesterday and tolerated well  Can give Vivitrol IM injection 380 mg today  Patient has self-reported history of anxiety and depression-may need medication adjustments so Psychiatric evaluation outpatient is warranted

## 2025-02-19 NOTE — DISCHARGE INSTR - OTHER ORDERS
AA meeting guide   https://www.aa.org/find-aa    AA Phone apps:   Meeting Guide  Everything AA  In the Rooms    AA/24 hour hotline   [phone]    SYN Recovery Events    [facility] Recovery Adventure organizes meaningful events for people in recovery and their families. Our main goal is to have fun by connecting with nature and other people. We can then realize that there is a world of possibilities open to us, now that we are no longer in the bondage of addiction. These events are designed to provide :  Hope for those in early recovery  Tangible proof that life gets better when we’re sober  Service opportunities for people with recovery experience  Social connectedness for everyone involved    [address]  [place] [address]  Phone: [phone]  Email: info@Drive   Website: https://Drive/    SMART Recovery Meetings    Self Management and Recovery Training (SMART)  SMART Recovery is an evidenced-informed recovery method grounded in Rational Emotive Behavioral Therapy (REBT) and Cognitive Behavioral Therapy (CBT), that supports people with substance dependencies or problem behaviors to:  Build and maintain motivation  Poneto with urges and cravings  Manage thoughts, feelings and behaviors  Live a balanced life    Find meetings and tools: https://Lailaihui.org/    Transportation Resources    Mainor Thibodeaux/ Joel  General Information  Customer Online Comment/Complaint Form  Customer Service:  [phone]  Monday to Friday - 7 AM to  5:30 PM  Saturday - 8 AM to 4:30 PM  Sunday - 8 AM to 4:30 PM - call center only  Customer Service is closed on holidays that bus service does not run.  Joel (formerly [employer])  [phone] - Service Reservations  [phone] - General Information Applications  Monday to Friday -- 8 AM to 4:30 PM  [place] Office:  [address] [place] [address]  Monday to Friday -- 8 AM to 4:30 PM  [place] Office:  [address] [place] [address]  Monday  to Friday -- 8 AM to 4:30 PM  LANtaBus and LANtaVan service hours are  Weekdays - 5:30 AM to 7:30 PM  Saturdays - 5:30 AM to 7:30 PM  Sundays - 7:00 AM to 7:15 PM  Service on 100 series routes operates after 7:30 pm as late as midnight on weekdays and Saturdays.  Please see individual schedules for details on specific service times.  LAN[employer]Van (formerly [employer]) ADA paratransit service is available during all [facility] service hours.    Austin's Ride    We established Austin’s Ride to help cope with our loss. We want his legacy of compassion and generosity to continue long after his death. Using a ride share service such as Karm[employer]op or Uber, Linda Ridshabnam will provide free transportation to people living with substance use disorder to get to recovery services. Transportation to outpatient rehab, 12-step meetings and other recovery resources is often a significant barrier to recovery, and Austin would want us to help mitigate that. By helping others who live with substance use disorder, Austin continues to make a difference in our world.    Phone: [phone]  Email: info@Kinsights.SensorWave  Website: https://Kinsights.org/    Medical Services Included in MATP   Transportation is available to almost any service that [place] pays for. Transportation can be provided to: physicians, dentists, health clinics, podiatrists, rural health clinics, hospice programs, physical therapists, outpatient services, pharmacies, drug and alcohol clinics, mental health centers, outpatient rehab services, optometrists, dialysis clinics, psychologists, and ambulatory surgical services.   Services that [facility] does not include are emergency or other transportation requiring an ambulance, transportation to sheltered workshops, day care programs, transportation for visitation purposes, stretcher service, fkqd-yelgmxv-lyfa service, transportation to non-medical services, and transportation during severe weather when deemed unsafe or transportation to any  medical services that are not payable through the Medical Assistance Program.   Exceptional transportation costs such as air travel, lodging, meals, and attendants are paid for by local county assistance offices instead of [facility].     [place] [phone]         Local Food Bank Locations & Contact Information:   Soup Pilar    [facility] Food Garcia    [place] [phone]  [facility]  Address: [address], [place] [address]  Phone: [phone]  Hours of Operation: Wednesdays 10:00AM-12:00PM  Tonya Aponte [facility]  Address: [address], [place] [address]  Phone: [phone]  Hours of Operation: 1st & 3rd Tuesdays 5:00PM-6:00PM  Kar Chatterjee [facility]  Address: [address], [place], [address]  Phone: [phone]  Hours of Operation: Tuesdays 6:00PM-7:00PM  [facility]  Address: [address], [place] [address]  Phone: [phone]  Hours of Operation: 1st & 2nd Saturdays 12:00PM- 4:00PM  [facility]  Address: [address], [place] [address]  Phone: [phone]  Hours of Operation: Monday-Friday 10:30AM-11:30AM  [place] [facility]  Address: [address], [place] [address]  Phone: [phone]  Hours of Operation: 3rd & 4th Saturdays 9:00AM-11:00AM; [facility] residents only    Soup Pilar    [place] [facility]  Address: [address], [place] [address]  Phone: [phone]  Ours of Operation: Sundays 1:00PM-2:00PM  [facility]  Address: [address], [place] [address]  Phone: [phone]  Hours of Operation: Saturdays 12:00PM-1:00PM  [facility]  Address: [address] [place] [address], [place] [address]  Phone: [phone]  Hours of Operation: Monday-Friday 8:00AM-4:00PM  [facility] [employer]  Address: [address], [place] [address]  Phone:  [phone]  Hours of Operation: Monday-Friday 12:00PM-1:00PM   CRISIS INFORMATION  If you are experiencing a mental health emergency, you may call the [facility] Crisis Intervention Office 24 hours a day, 7 days per week at [phone].    In [facility], call [phone].    Warmline is a confidential 24/7 telephone support service manned by trained mental health consumers.  Warmline provides support, a listening ear and can provide information about available services.Warmline specializes in the concerns of mental health consumers, their families and friends.  However, we are also here for anyone who has a mental health concern, is confused about or just doesn't know anything about mental health or where to get information.  To reach [facility], call [phone].    HOW TO GET SUBSTANCE ABUSE HELP:  If you or someone you know has a drug or alcohol problem, there is help:  [facility] Drug & Alcohol Abuse Services: [phone]  [facility] Drug & Alcohol Abuse Services: [phone]  An assessment is the first step.   In addition to those listed there are other programs available in the area but assessment is best to determine an appropriate level of care.  If you DO NOT have Medical Assistance (MA) or Private Insurance, an assessment can be scheduled at one of these providers:  [facility]  [address] [place] [address]  [phone]   [facility]  [address] [place] [address]  [phone]   [address] [place] [address]  [phone]   [facility]  [address] [place] [address]  [phone]   [facility] [employer]  [address] [place] [address]  [phone]   [facility]  [address] [place] [address]  [phone]     If you HAVE Medical Assistance, an assessment can be scheduled at one of these providers:  [facility]  [address] [place] [address]  [phone]   [facility]  [address] [place] [address]  [phone]   [facility] [address] [facility] D&A Intake Unit  [address], [place] [address]  [phone]  [address] [place] [address], [address], [place] [address] [phone]   [address] [address]  [phone]   [address] [place] [address]  [phone]   NET ([facility])  [address] [facility] [place] [address]  [phone]   [email or web address]  [address] [place] [address]  [phone]   [facility]  [address] [place] [address]  [phone]   [facility]  [address] [place] [address]  [phone]     If you HAVE Private Insurance, an assessment can be scheduled at one of these providers.  Please contact these Providers to determine if they are in your network plan:  [facility] [address] D&A Intake Unit  [address], [place] [address]  [phone]   [address] [address]  [phone]   [address] [place] [address]  [phone]   NET ([facility])  [address] [facility] [facility] [place] [address]  [phone]   [email or web address]  [address] [place] [address]  [phone]     From the [facility] website www.[place].gov/guides/opioid-epidemic/#GetNaloxone    How do I get naloxone?  Family members and friends can access naloxone by:    Obtaining a prescription from their family doctor  Using the standing order issued by [facility] Physician General Pippa Mahoney. A standing order is a prescription written for the general public, rather than specifically for an individual.  To use the standing order, print it and take it with you to the pharmacy or have the digital version on your phone. Download the standing order  from the Department of Health (PDF).    If you are unable to print it or use the digital version, the standing order is kept on file at many pharmacies. If a pharmacy does not have it on file, they may have the ability to look it up.    Naloxone prescriptions can be filled at most pharmacies. Although the medication might not be available for same-day pickup, it often can be ordered and available within a day or two.            Drug and Alcohol Resources in [facility]    If you have health insurance, including medical assistance, there should be a phone number on your insurance card that you can call to find out how to access services. The card may say, “For Behavioral Health Services” or “For Drug and Alcohol Services” or “For Substance Abuse Services” call the number provided. OR [place] Get Help Now (5-741-655-HELP)    [facility] Drug & Alcohol Division  Located at [address] [place] [address]. [phone]. A  is available Monday through Friday from 8:00 am to 5:00 pm to provide you with assistance on accessing services for substance abuse. If you do not have health insurance and are in financial need, this office may also help you get the funding for the services that are necessary.   If you are calling after 5:00 pm, on a weekend, or a holiday and need immediate assistance, contact Emergency Services at [phone].  No cost drug and alcohol assessments and Case Management services are available to any resident in need. Please call [phone] or call [phone] and you will be connected with an .    Recovery Centers  [facility] Drug & Alcohol provides funding to support three Recovery Centers in [facility]. These centers offer a safe, sober environment to those in recovery. A variety of programming including 12-Step Meetings, Yoga, Karaoke, Life Skills Workshops, etc. is offered at each location.  [facility]  [address]  [place]  [address]  [phone]  www.[email or web address] [facility]  [address] [place] [address]  [phone]  [facility]wt[facility]-lq-np[facility]  [address] [place] [place] [address]  [phone]   [facility]  [address] [place] [address]  [phone]  [email or web address] [facility]  [address] [place], [place] [address] [phone]  [facility]      [facility]    [facility] at [address] in [place], [place] Drop-In Program for veterans. This collaborative programming between Drug & Alcohol, [facility], [facility] and [facility] will be held every Friday from 9AM-4PM to assist veterans in their pursuit of their overall wellness and provide them with access to services.   Services available for veterans on Fridays at the [facility] will include peer support groups, professional support groups/MST, assistance with job searches and writing resumes, legal clinics, trainings, VA benefit assistance, on-site Drug & Alcohol assessments and referrals to treatment. A Certified  will also be available on-site.   [facility] residents struggling with substance abuse can call the Drug and Alcohol Division at [phone]([phone]). Veterans can also call  Affairs at [phone].    [facility] National Helpline  Confidential free help, from public health agencies, to find substance use treatment and information. [phone]    Link for Zoom Codes for Virtual 12 step Meetings  Https://www.[facility].org/HS/DRGALC/Pages/default.aspx    Outpatient Treatment Providers  [facility]   [address] [place] [address]  Phone: [phone]  [facility]  [address]  [place] [address]  Phone: [phone]  [employer]  [address] [place] [address]  Phone: [phone]  [facility]  [address] [place] [address]  Phone: [phone]  [place] [address]  [address]  [place] [address] [phone]

## 2025-02-20 VITALS
SYSTOLIC BLOOD PRESSURE: 135 MMHG | HEIGHT: 72 IN | OXYGEN SATURATION: 96 % | WEIGHT: 236.11 LBS | DIASTOLIC BLOOD PRESSURE: 82 MMHG | RESPIRATION RATE: 18 BRPM | HEART RATE: 76 BPM | TEMPERATURE: 97.7 F | BODY MASS INDEX: 31.98 KG/M2

## 2025-02-20 LAB
ALBUMIN SERPL BCG-MCNC: 3.3 G/DL (ref 3.5–5)
ALP SERPL-CCNC: 97 U/L (ref 34–104)
ALT SERPL W P-5'-P-CCNC: 28 U/L (ref 7–52)
ANION GAP SERPL CALCULATED.3IONS-SCNC: 7 MMOL/L (ref 4–13)
AST SERPL W P-5'-P-CCNC: 25 U/L (ref 13–39)
BILIRUB SERPL-MCNC: 0.36 MG/DL (ref 0.2–1)
BUN SERPL-MCNC: 10 MG/DL (ref 5–25)
CALCIUM ALBUM COR SERPL-MCNC: 8.6 MG/DL (ref 8.3–10.1)
CALCIUM SERPL-MCNC: 8 MG/DL (ref 8.4–10.2)
CHLORIDE SERPL-SCNC: 106 MMOL/L (ref 96–108)
CO2 SERPL-SCNC: 24 MMOL/L (ref 21–32)
CREAT SERPL-MCNC: 0.85 MG/DL (ref 0.6–1.3)
GFR SERPL CREATININE-BSD FRML MDRD: 96 ML/MIN/1.73SQ M
GLUCOSE SERPL-MCNC: 89 MG/DL (ref 65–140)
POTASSIUM SERPL-SCNC: 3.6 MMOL/L (ref 3.5–5.3)
PROT SERPL-MCNC: 5.6 G/DL (ref 6.4–8.4)
SODIUM SERPL-SCNC: 137 MMOL/L (ref 135–147)

## 2025-02-20 PROCEDURE — 99239 HOSP IP/OBS DSCHRG MGMT >30: CPT | Performed by: STUDENT IN AN ORGANIZED HEALTH CARE EDUCATION/TRAINING PROGRAM

## 2025-02-20 PROCEDURE — 99232 SBSQ HOSP IP/OBS MODERATE 35: CPT | Performed by: EMERGENCY MEDICINE

## 2025-02-20 PROCEDURE — 80053 COMPREHEN METABOLIC PANEL: CPT | Performed by: STUDENT IN AN ORGANIZED HEALTH CARE EDUCATION/TRAINING PROGRAM

## 2025-02-20 RX ORDER — LANOLIN ALCOHOL/MO/W.PET/CERES
100 CREAM (GRAM) TOPICAL DAILY
Qty: 30 TABLET | Refills: 0 | Status: SHIPPED | OUTPATIENT
Start: 2025-02-21

## 2025-02-20 RX ORDER — TOPIRAMATE 25 MG/1
TABLET, FILM COATED ORAL
Qty: 14 TABLET | Refills: 0 | Status: SHIPPED | OUTPATIENT
Start: 2025-02-20

## 2025-02-20 RX ORDER — FOLIC ACID 1 MG/1
1 TABLET ORAL DAILY
Qty: 30 TABLET | Refills: 0 | Status: SHIPPED | OUTPATIENT
Start: 2025-02-21

## 2025-02-20 RX ORDER — LOPERAMIDE HYDROCHLORIDE 2 MG/1
4 CAPSULE ORAL 4 TIMES DAILY PRN
Qty: 60 CAPSULE | Refills: 0 | Status: SHIPPED | OUTPATIENT
Start: 2025-02-20 | End: 2025-03-06

## 2025-02-20 RX ORDER — TRAZODONE HYDROCHLORIDE 50 MG/1
50 TABLET, FILM COATED ORAL
Status: DISCONTINUED | OUTPATIENT
Start: 2025-02-20 | End: 2025-02-20 | Stop reason: HOSPADM

## 2025-02-20 RX ORDER — DICYCLOMINE HCL 20 MG
20 TABLET ORAL EVERY 6 HOURS
Qty: 14 TABLET | Refills: 0 | Status: SHIPPED | OUTPATIENT
Start: 2025-02-20 | End: 2025-03-06

## 2025-02-20 RX ORDER — ESCITALOPRAM OXALATE 20 MG/1
20 TABLET ORAL DAILY
Qty: 14 TABLET | Refills: 0 | Status: SHIPPED | OUTPATIENT
Start: 2025-02-20 | End: 2025-03-06

## 2025-02-20 RX ADMIN — Medication 1 PATCH: at 08:23

## 2025-02-20 RX ADMIN — HYDROXYZINE HYDROCHLORIDE 25 MG: 25 TABLET ORAL at 00:53

## 2025-02-20 RX ADMIN — HYDROXYZINE HYDROCHLORIDE 25 MG: 25 TABLET ORAL at 15:22

## 2025-02-20 RX ADMIN — DICYCLOMINE HYDROCHLORIDE 20 MG: 20 TABLET ORAL at 08:31

## 2025-02-20 RX ADMIN — MULTIPLE VITAMINS W/ MINERALS TAB 1 TABLET: TAB ORAL at 08:23

## 2025-02-20 RX ADMIN — ENOXAPARIN SODIUM 40 MG: 40 INJECTION SUBCUTANEOUS at 08:23

## 2025-02-20 RX ADMIN — ESCITALOPRAM OXALATE 20 MG: 10 TABLET ORAL at 08:23

## 2025-02-20 RX ADMIN — THIAMINE HCL TAB 100 MG 100 MG: 100 TAB at 08:23

## 2025-02-20 RX ADMIN — DICYCLOMINE HYDROCHLORIDE 20 MG: 20 TABLET ORAL at 00:53

## 2025-02-20 RX ADMIN — PANTOPRAZOLE SODIUM 40 MG: 40 TABLET, DELAYED RELEASE ORAL at 06:17

## 2025-02-20 RX ADMIN — METOCLOPRAMIDE 10 MG: 5 INJECTION, SOLUTION INTRAMUSCULAR; INTRAVENOUS at 08:29

## 2025-02-20 RX ADMIN — FOLIC ACID 1 MG: 1 TABLET ORAL at 08:23

## 2025-02-20 RX ADMIN — METOCLOPRAMIDE 10 MG: 5 INJECTION, SOLUTION INTRAMUSCULAR; INTRAVENOUS at 00:53

## 2025-02-20 RX ADMIN — TOPIRAMATE 50 MG: 25 TABLET, FILM COATED ORAL at 08:23

## 2025-02-20 NOTE — ASSESSMENT & PLAN NOTE
Initiate MELVIN's protocol with symptom triggered dosing of phenobarbital  Do not exceed more than 2000 mg of phenobarbital within the entirety of the hospital stay  Patient has received approximately 1170 mg of phenobarbital thus far  Discussed initiation of naltrexone  Patient agreeable to starting naltrexone and denies any opioid or supplementation use including kratom  Off SEWS protocol -- is cleared from a withdrawal perspective

## 2025-02-20 NOTE — DISCHARGE SUMMARY
Discharge Summary - Hospitalist   Name: Milan Lal 60 y.o. male I MRN: [id number]  Unit/Bed#: 5T [facility] 513-01 I Date of Admission: 2/17/2025   Date of Service: 2/20/2025 I Hospital Day: 3     Assessment & Plan  At risk for alcohol withdrawal  Patient presented requesting Detox. Reports drinking 1/2 G vodka/day + shooters. Tried to stop drinking on Monday and started with withdrawal symptoms and took 5 shoots around 5 pm [facility]  Patient was noted to be anxious, had tremors, diaphoretic, tachycardic  SEWS protocol with phenobarbital. Received 1170mg of phenobarbital  Appreciate toxicology input  Started on naltrexone 380MG IM, to be arranged outpatient for further treatment  Alcohol use disorder, severe, dependence (HCC)  Patient drinking 1/2 G Vodka/day + shooters with heavy use for past 6 months- year.   No reports of alcohol withdrawal seizures  MVI/Thiamine/folic acid  CRS and CM consulted for support and resources  Plan for [facility] at [facility]  Hypomagnesemia  Repleted  Hypokalemia  Resolved  Seizure disorder (HCC)  Currently not on antiseizure medications, no reports of seizures for over 5 years  Currently following with neurology regarding migraines  Class 1 obesity due to excess calories without serious comorbidity with body mass index (BMI) of 31.0 to 31.9 in adult  BMI 31  Healthy diet and lifestyle modification recommended  Hoffmann's esophagus without dysplasia  Continue PPI  Bentyl PRN  Vapes nicotine containing substance  Patient reports vapes frequently on daily basis  Nicotine patch 14 mg   Smoking cessation recommended  Migraine  Continue Topamax 25 mg twice daily with triptan as needed as needed     Discharging Physician / Practitioner: Randy Aleman MD  PCP: Benjamín Patiño MD  Admission Date:   Admission Orders (From admission, onward)       Ordered        02/17/25 5152  Inpatient Admission  Once                          Discharge Date: 02/20/25    Medical  Problems       Resolved Problems  Date Reviewed: 2/18/2025   None         Consultations During Hospital Stay:  Toxicology  CRS    Procedures Performed:   none    Significant Findings / Test Results:   No results found.    Incidental Findings:   none     Test Results Pending at Discharge (will require follow up):   none     Outpatient Tests Requested:  none    Complications:  none    Reason for Admission: Alcohol Withdrawals    Hospital Course:     Milan Lal is a 60 y.o. male patient who originally presented to the hospital on 2/17/2025 due to alcohol withdrawal.  Patient was noted to be tremulous, diaphoretic and tachycardic.  He was admitted for alcohol withdrawal management.  Previously drinking about a half a gallon of vodka per day.  He was treated with fluids, phenobarbital.  After several days, patient symptoms did improved.  Toxicology cleared for discharge.  He was recommended naltrexone IM which she was given a dose.  Patient was discharged to [facility] for further treatment.    Please see above list of diagnoses and related plan for additional information.     Condition at Discharge: fair     Discharge Day Visit / Exam:     Subjective:    No events overnight. Overall feeling well. Does have some infrequent nausea without vomiting. Denies any CP or SOB.  Vitals: Blood Pressure: 114/66 (02/20/25 0800)  Pulse: 61 (02/20/25 0800)  Temperature: 97.7 °F (36.5 °C) (02/20/25 0800)  Temp Source: Temporal (02/20/25 0800)  Respirations: 18 (02/20/25 0800)  Height: 6' (182.9 cm) (02/17/25 2350)  Weight - Scale: 107 kg (236 lb 1.8 oz) (02/17/25 2350)  SpO2: 94 % (02/20/25 0800)  Exam:   Physical Exam  Vitals and nursing note reviewed.   Constitutional:       Appearance: He is obese.   Cardiovascular:      Rate and Rhythm: Normal rate.      Heart sounds: Normal heart sounds.   Pulmonary:      Breath sounds: Normal breath sounds. No wheezing.   Abdominal:      General: Bowel sounds are normal. There is no  distension.      Palpations: Abdomen is soft.   Musculoskeletal:         General: No swelling.      Right lower leg: No edema.      Left lower leg: No edema.   Skin:     General: Skin is warm and dry.   Neurological:      General: No focal deficit present.      Mental Status: He is alert. Mental status is at baseline.         Discharge instructions/Information to patient and family:   See after visit summary for information provided to patient and family.      Provisions for Follow-Up Care:  See after visit summary for information related to follow-up care and any pertinent home health orders.      Disposition:     [facility]    Discharge Statement:  I spent 40 minutes discharging the patient. This time was spent on the day of discharge. I had direct contact with the patient on the day of discharge. Greater than 50% of the total time was spent examining patient, answering all patient questions, arranging and discussing plan of care with patient as well as directly providing post-discharge instructions.  Additional time then spent on discharge activities.    Discharge Medications:  See after visit summary for reconciled discharge medications provided to patient and family.      ** Please Note: This note has been constructed using a voice recognition system **

## 2025-02-20 NOTE — ASSESSMENT & PLAN NOTE
Patient drinking 1/2 G Vodka/day + shooters with heavy use for past 6 months- year.   No reports of alcohol withdrawal seizures  MVI/Thiamine/folic acid  CRS and CM consulted for support and resources  Plan for [facility] at [facility]

## 2025-02-20 NOTE — UTILIZATION REVIEW
NOTIFICATION OF INPATIENT MEDICAL ADMISSION   AUTHORIZATION REQUEST   SERVICING FACILITY:   [address] [address]  Tax ID: [id number]  NPI: [id number] ATTENDING PROVIDER:  Attending Name and NPI#: Randy Aleman Md [[id number]]  Address: [address] [place] [address]  Phone: [phone]     ADMISSION INFORMATION:  Place of Service: Inpatient [facility]  Place of Service Code: 21  Inpatient Admission Date/Time: 2/17/25 11:44 PM  Discharge Date/Time: No discharge date for patient encounter.  Admitting Diagnosis Code/Description:  S/P alcohol detoxification [Z92.89]     UTILIZATION REVIEW CONTACT:  Martina Diaz, Utilization   Network Utilization Review Department  Phone: [phone]  Fax [phone]  Email: [email or web address]  Contact for approvals/pending authorizations, clinical reviews, and discharge.     PHYSICIAN ADVISORY SERVICES:  Medical Necessity Denial & Gjhp-tk-Hfrr Review  Phone: [phone]  Fax: [phone]  Email: [email or web address]     DISCHARGE SUPPORT TEAM:  For Patients Discharge Needs & Updates  Phone: [phone] opt. 2 Fax: [phone]  Email: [email or web address]

## 2025-02-20 NOTE — PROGRESS NOTES
Progress Note - Medical Toxicology   Name: Milan Lal 60 y.o. male I MRN: [id number]  Unit/Bed#: 5T DETOX 513-01 I Date of Admission: 2/17/2025   Date of Service: 2/20/2025 I Hospital Day: 3    Assessment & Plan  At risk for alcohol withdrawal  Initiate CIWA's protocol with symptom triggered dosing of phenobarbital  Do not exceed more than 2000 mg of phenobarbital within the entirety of the hospital stay  Patient has received approximately 1170 mg of phenobarbital thus far  Discussed initiation of naltrexone  Patient agreeable to starting naltrexone and denies any opioid or supplementation use including kratom  [facility] -- is cleared from a withdrawal perspective    Alcohol use disorder, severe, dependence (HCC)  Continue thiamine, folate, daily multivitamin  Consult CRS for recovery support  Consult case management for disposition planning  Patient states he is interested in inpatient rehabilitation and he has been talking with case management/CRS about the logistics  Patient given 25 mg naltrexone yesterday and tolerated well  Vivitrol was administered yesterday -- tolerated well  Patient has self-reported history of anxiety and depression-may need medication adjustments so Psychiatric evaluation outpatient is warranted  Vapes nicotine containing substance  Discussed nicotine replacement therapy  Patient agreeable to nicotine patch  Can replace patch as needed  Counseled on health risks and quitting    Reason for current consult: alcohol withdrawal     Subjective   Reports migraine, nausea, anxiety, stomach cramping -unchanged from yesterday  No chest pain, shortness of breath, diarrhea, sweats, tremors, gait instability    Objective :  Temp:  [97.7 °F (36.5 °C)-98.1 °F (36.7 °C)] 97.7 °F (36.5 °C)  HR:  [61-68] 61  BP: (114-128)/(66-76) 114/66  Resp:  [18] 18  SpO2:  [94 %-98 %] 94 %  O2 Device: None (Room air)    No intake or output data in the 24 hours ending 02/20/25 0852    Physical Exam  Vitals  and nursing note reviewed.   Constitutional:       General: He is not in acute distress.     Appearance: Normal appearance. He is well-developed. He is not ill-appearing, toxic-appearing or diaphoretic.   HENT:      Head: Normocephalic and atraumatic.      Right Ear: External ear normal.      Left Ear: External ear normal.      Nose: Nose normal.      Mouth/Throat:      Mouth: Mucous membranes are dry.   Eyes:      General:         Right eye: No discharge.         Left eye: No discharge.      Extraocular Movements: Extraocular movements intact.      Conjunctiva/sclera: Conjunctivae normal.      Pupils: Pupils are equal, round, and reactive to light.   Cardiovascular:      Rate and Rhythm: Normal rate and regular rhythm.      Heart sounds: Normal heart sounds.      No friction rub. No gallop.   Pulmonary:      Effort: Pulmonary effort is normal. No respiratory distress.      Breath sounds: Normal breath sounds. No stridor. No wheezing, rhonchi or rales.   Chest:      Chest wall: No tenderness.   Abdominal:      General: Bowel sounds are normal.      Palpations: Abdomen is soft.      Tenderness: There is no abdominal tenderness. There is no guarding or rebound.   Musculoskeletal:         General: No tenderness or deformity. Normal range of motion.      Cervical back: Normal range of motion and neck supple.      Right lower leg: No edema.      Left lower leg: No edema.   Skin:     General: Skin is warm and dry.      Capillary Refill: Capillary refill takes less than 2 seconds.   Neurological:      Mental Status: He is alert and oriented to person, place, and time.      GCS: GCS eye subscore is 4. GCS verbal subscore is 5. GCS motor subscore is 6.      Motor: No weakness or tremor.           Lab Results: I have reviewed the following results:CBC/BMP:   .     02/20/25  0449   SODIUM 137   K 3.6      CO2 24   BUN 10   CREATININE 0.85   GLUC 89    , Creatinine Clearance: Estimated Creatinine Clearance: 119.8 mL/min  (by C-G formula based on SCr of 0.85 mg/dL)., LFTs:   .     02/20/25  0449   AST 25   ALT 28   ALB 3.3*   TBILI 0.36   ALKPHOS 97    , PTT/INR:No new results in last 24 hours.     Imaging Results Review: No pertinent imaging studies reviewed.  Other Study Results Review: EKG was personally reviewed and my interpretation is: Sinus Tachycardia. , QRS 82, QTc 474, incomplete right bundle branch block..    Administrative Statements   I have spent a total time of 25 minutes in caring for this patient on the day of the visit/encounter including Risks and benefits of tx options, Instructions for management, Patient and family education, Importance of tx compliance, Risk factor reductions, Impressions, Counseling / Coordination of care, Documenting in the medical record, Reviewing/placing orders in the medical record (including tests, medications, and/or procedures), Obtaining or reviewing history  , and Communicating with other healthcare professionals .

## 2025-02-20 NOTE — PLAN OF CARE
Problem: PAIN - ADULT  Goal: Verbalizes/displays adequate comfort level or baseline comfort level  Description: Interventions:  - Encourage patient to monitor pain and request assistance  - Assess pain using appropriate pain scale  - Administer analgesics based on type and severity of pain and evaluate response  - Implement non-pharmacological measures as appropriate and evaluate response  - Consider cultural and social influences on pain and pain management  - Notify physician/advanced practitioner if interventions unsuccessful or patient reports new pain  Outcome: Progressing     Problem: INFECTION - ADULT  Goal: Absence or prevention of progression during hospitalization  Description: INTERVENTIONS:  - Assess and monitor for signs and symptoms of infection  - Monitor lab/diagnostic results  - Administer medications as ordered  - Instruct and encourage patient and family to use good hand hygiene technique  - Identify and instruct in appropriate isolation precautions for identified infection/condition  Outcome: Progressing  Goal: Absence of fever/infection during neutropenic period  Description: INTERVENTIONS:  - Monitor WBC    Outcome: Progressing     Problem: SAFETY ADULT  Goal: Patient will remain free of falls  Description: INTERVENTIONS:  - Educate patient/family on patient safety including physical limitations  - Instruct patient to call for assistance with activity   - Consult OT/PT to assist with strengthening/mobility   - Keep Call bell within reach  - Keep bed low and locked with side rails adjusted as appropriate  - Keep care items and personal belongings within reach  - Initiate and maintain comfort rounds  - Make Fall Risk Sign visible to staff  - Apply yellow socks and bracelet for high fall risk patients  - Consider moving patient to room near nurses station  Outcome: Progressing  Goal: Maintain or return to baseline ADL function  Description: INTERVENTIONS:  -  Assess patient's ability to carry  out ADLs; assess patient's baseline for ADL function and identify physical deficits which impact ability to perform ADLs (bathing, care of mouth/teeth, toileting, grooming, dressing, etc.)  - Assess/evaluate cause of self-care deficits   - Assess range of motion  - Assess patient's mobility; develop plan if impaired  - Assess patient's need for assistive devices and provide as appropriate  - Encourage maximum independence but intervene and supervise when necessary  - Involve family in performance of ADLs  - Assess for home care needs following discharge   - Consider OT consult to assist with ADL evaluation and planning for discharge  - Provide patient education as appropriate  Outcome: Progressing  Goal: Maintains/Returns to pre admission functional level  Description: INTERVENTIONS:  - Perform AM-PAC 6 Click Basic Mobility/ Daily Activity assessment daily.  - Set and communicate daily mobility goal to care team and patient/family/caregiver.   - Collaborate with rehabilitation services on mobility goals if consulted  - Out of bed for toileting  - Record patient progress and toleration of activity level   Outcome: Progressing     Problem: DISCHARGE PLANNING  Goal: Discharge to home or other facility with appropriate resources  Description: INTERVENTIONS:  - Identify barriers to discharge w/patient and caregiver  - Arrange for needed discharge resources and transportation as appropriate  - Identify discharge learning needs (meds, wound care, etc.)  - Arrange for interpretive services to assist at discharge as needed  - Refer to Case Management Department for coordinating discharge planning if the patient needs post-hospital services based on physician/advanced practitioner order or complex needs related to functional status, cognitive ability, or social support system  Outcome: Progressing     Problem: Knowledge Deficit  Goal: Patient/family/caregiver demonstrates understanding of disease process, treatment plan,  medications, and discharge instructions  Description: Complete learning assessment and assess knowledge base.  Interventions:  - Provide teaching at level of understanding  - Provide teaching via preferred learning methods  Outcome: Progressing     Problem: SUBSTANCE USE/ABUSE  Goal: By discharge, will develop insight into their chemical dependency and sustain motivation to continue in recovery  Description: INTERVENTIONS:  - Attends all daily group sessions and scheduled AA groups  - Actively practices coping skills through participation in the therapeutic community and adherence to program rules  - Reviews and completes assignments from individual treatment plan  - Assist patient development of understanding of their personal cycle of addiction and relapse triggers  Outcome: Progressing  Goal: By discharge, patient will have ongoing treatment plan addressing chemical dependency  Description: INTERVENTIONS:  - Assist patient with resources and/or appointments for ongoing recovery based living  Outcome: Progressing     Problem: CARDIOVASCULAR - ADULT  Goal: Maintains optimal cardiac output and hemodynamic stability  Description: INTERVENTIONS:  - Monitor I/O, vital signs and rhythm  - Monitor for S/S and trends of decreased cardiac output  - Administer and titrate ordered vasoactive medications to optimize hemodynamic stability  - Assess quality of pulses, skin color and temperature  - Assess for signs of decreased coronary artery perfusion  - Instruct patient to report change in severity of symptoms  Outcome: Progressing  Goal: Absence of cardiac dysrhythmias or at baseline rhythm  Description: INTERVENTIONS:  - Continuous cardiac monitoring, vital signs, obtain 12 lead EKG if ordered  - Administer antiarrhythmic and heart rate control medications as ordered  - Monitor electrolytes and administer replacement therapy as ordered  Outcome: Progressing     Problem: METABOLIC, FLUID AND ELECTROLYTES - ADULT  Goal:  Electrolytes maintained within normal limits  Description: INTERVENTIONS:  - Monitor labs and assess patient for signs and symptoms of electrolyte imbalances  - Administer electrolyte replacement as ordered  - Monitor response to electrolyte replacements, including repeat lab results as appropriate  - Instruct patient on fluid and nutrition as appropriate  Outcome: Progressing

## 2025-02-20 NOTE — ASSESSMENT & PLAN NOTE
Patient presented requesting Detox. Reports drinking 1/2 G vodka/day + shooters. Tried to stop drinking on Monday and started with withdrawal symptoms and took 5 shoots around 5 pm and presented to [facility] ED.  Patient was noted to be anxious, had tremors, diaphoretic, tachycardic  SEWS protocol with phenobarbital. Received 1170mg of phenobarbital  Appreciate toxicology input  Started on naltrexone 380MG IM, to be arranged outpatient for further treatment

## 2025-02-20 NOTE — PROGRESS NOTES
0826  CM met with Pt to review his discharge goals.  Pt verbalizes continued commitment to IP IGOR Tx.  Pt verbalizes that he is willing to broaden his options to include [facility] and [facility].  GERA signed.    0900  CM received request for labs from [facility].    0945  Requested labs printed and sent for review excluding Hep B and Hep C and TB/ Chest X Ray.  CM informed admissions department that the aforementioned test was not completed during this encounter.      1009  CM receives a call from Del/ Admissions Coordinator at [facility] ([phone]) confirmed Pt has been accepted for admission.  CM is informed that Pt would have a financial obligation of $750 monthly, needs to have TB testing completed, and Hep B/ C testing.  CM explained that Hep B/C test results would not result today and admissions verbalizes that they can accept without said testing being completed.  Del confirmed that they can accept a chest x-ray in lieu of TB test.  CM updated Pt of same.  Pt request to hear back from other programs before making a decision about accepting bed offer.  CM informed by [facility] that they are not able to provide transportation.       1114  CM informed that Pt has been accepted to [facility] for admission on marginal date.  Pt updated on same.  Pt defers decision of acceptance until hearing from [facility].  Clinicals sent to [facility] for review.    1152  CM received a call from [facility]/ Del requesting update.  CM provided update of Attending willingness to order chest X-Ray and Hep B/C testing.  CM advised that Pt was awaiting to hear from another program before making a commitment.  CM was advised that facility was withdrawing bed offer due to Pt lack of certainty in wanting to attend their program.  Pt updated on the same.    1244  [facility] verbalizes that they have bed availability and will contact managed medicare plan to find out if they will  cover cost of treatment.      1315  CM initiates and completes Pre-cert with [facility].  Per Cristina GIBBS Pt is approved for 16 days at [facility]5E LOC.    1330  CM informed that Pt has been accepted [facility]  [facility] declines Pt as they don't provide dual services.  CM met with Pt and provided update.  Pt in agreement with going to [facility].      1445  Medications picked up from Pharmacy.    Transport scheduled for a 1630  for direct admission to [facility].

## 2025-02-20 NOTE — ASSESSMENT & PLAN NOTE
Continue thiamine, folate, daily multivitamin  Consult CRS for recovery support  Consult case management for disposition planning  Patient states he is interested in inpatient rehabilitation and he has been talking with case management/CRS about the logistics  Patient given 25 mg naltrexone yesterday and tolerated well  Vivitrol was administered yesterday -- tolerated well  Patient has self-reported history of anxiety and depression-may need medication adjustments so Psychiatric evaluation outpatient is warranted

## 2025-02-21 NOTE — PROGRESS NOTES
Discharge Planning   Living Arrangements Lives Alone   Support Systems Self;Organized support group (Comment);Friends/neighbors   Type of Current Residence Private residence   Current Home Care Services No   Other Referral/Resources/Interventions Provided:   Financial Resources Provided Indigent Medication  (Indigent medication used to cover cost of medication needed for inpatient admission.)   Referrals Provided: [facility];Crisis Hotline   Post Acute Placement to: Substance Abuse Treatment  ([facility] for IP IGOR Tx)   Discharge Communications   Discharge planning discussed with: Discharge planning discussed with Pt whom denies symptoms of withdrawal.   Freedom of Choice Yes   Were Treatment Team discharge recommendations reviewed with patient/caregiver? Yes   Did patient/caregiver verbalize understanding of patient care needs? Yes   Were patient/caregiver advised of the risks associated with not following Treatment Team discharge recommendations? Yes   Contacts   Patient Contacts Self   Relationship to Patient: Other (Comment)   Contact Method In Person   Reason/Outcome Discharge Planning;Continuity of Care   [facility]   Would you like to participate in our [facility] service program?   No - Declined

## 2025-02-21 NOTE — UTILIZATION REVIEW
NOTIFICATION OF ADMISSION DISCHARGE   This is a Notification of Discharge from [facility]. Please be advised that this patient has been discharge from our facility. Below you will find the admission and discharge date and time including the patient’s disposition.   UTILIZATION REVIEW CONTACT:  Martina Diaz MA  Utilization   Network Utilization Review Department  Phone: [phone] x carefully listen to the prompts. All voicemails are confidential.  Email: [email or web address]     ADMISSION INFORMATION  PRESENTATION DATE: 2/17/2025 11:44 PM  OBERVATION ADMISSION DATE: N/A  INPATIENT ADMISSION DATE: 2/17/25 11:44 PM   DISCHARGE DATE: 2/20/2025  5:02 PM   [facility]    Network Utilization Review Department  ATTENTION: Please call with any questions or concerns to [phone] and carefully listen to the prompts so that you are directed to the right person. All voicemails are confidential.   For Discharge needs, contact Care Management DC Support Team at [phone] opt. 2  Send all requests for admission clinical reviews, approved or denied determinations and any other requests to dedicated fax number below belonging to the campus where the patient is receiving treatment. List of dedicated fax numbers for the Facilities:  FACILITY NAME UR FAX NUMBER   ADMISSION DENIALS (Administrative/Medical Necessity) [phone]   DISCHARGE SUPPORT TEAM (NETWORK) [phone]   PARENT CHILD HEALTH (Maternity/NICU/Pediatrics) [phone]   General acute hospital [phone]   General acute hospital [phone]   [facility] [phone]   [facility] [phone]   [facility] [phone]   [facility] [phone]   [facility] [phone]   [facility]  [facility] [phone]   [facility] [phone]   [facility] [phone]   [facility] [phone]   [facility] [phone]

## 2025-04-28 NOTE — ANESTHESIA POSTPROCEDURE EVALUATION
Post-Op Assessment Note      CV Status:  Stable    Mental Status:  Awake (drowsy but responsive)    Hydration Status:  Euvolemic    PONV Controlled:  Controlled    Airway Patency:  Patent    Post Op Vitals Reviewed: Yes          Staff: Anesthesiologist           BP (P) 140/87 (08/23/18 1131)    Temp (!) (P) 97 4 °F (36 3 °C) (08/23/18 1131)    Pulse (P) 91 (08/23/18 1131)   Resp (P) 17 (08/23/18 1131)    SpO2 (P) 95 % (08/23/18 1131)
Male

## 2025-05-14 ENCOUNTER — HOSPITAL ENCOUNTER (EMERGENCY)
Facility: HOSPITAL | Age: 60
Discharge: HOME | End: 2025-05-15
Attending: EMERGENCY MEDICINE
Payer: MEDICARE

## 2025-05-14 DIAGNOSIS — F19.10 POLYSUBSTANCE ABUSE (HCC): Primary | ICD-10-CM

## 2025-05-14 LAB
ALBUMIN SERPL BCG-MCNC: 4.1 G/DL (ref 3.5–5)
ALP SERPL-CCNC: 87 U/L (ref 34–104)
ALT SERPL W P-5'-P-CCNC: 31 U/L (ref 7–52)
ANION GAP SERPL CALCULATED.3IONS-SCNC: 9 MMOL/L (ref 4–13)
AST SERPL W P-5'-P-CCNC: 24 U/L (ref 13–39)
ATRIAL RATE: 83 BPM
BACTERIA UR QL AUTO: NORMAL /HPF
BASOPHILS # BLD AUTO: 0.07 THOUSANDS/ÂΜL (ref 0–0.1)
BASOPHILS NFR BLD AUTO: 1 % (ref 0–1)
BILIRUB SERPL-MCNC: 0.5 MG/DL (ref 0.2–1)
BILIRUB UR QL STRIP: NEGATIVE
BUN SERPL-MCNC: 13 MG/DL (ref 5–25)
CALCIUM SERPL-MCNC: 8.7 MG/DL (ref 8.4–10.2)
CHLORIDE SERPL-SCNC: 106 MMOL/L (ref 96–108)
CLARITY UR: CLEAR
CO2 SERPL-SCNC: 24 MMOL/L (ref 21–32)
COLOR UR: COLORLESS
CREAT SERPL-MCNC: 0.66 MG/DL (ref 0.6–1.3)
EOSINOPHIL # BLD AUTO: 0.12 THOUSAND/ÂΜL (ref 0–0.61)
EOSINOPHIL NFR BLD AUTO: 1 % (ref 0–6)
ERYTHROCYTE [DISTWIDTH] IN BLOOD BY AUTOMATED COUNT: 14 % (ref 11.6–15.1)
ETHANOL EXG-MCNC: 0.07 MG/DL
GFR SERPL CREATININE-BSD FRML MDRD: 106 ML/MIN/1.73SQ M
GLUCOSE SERPL-MCNC: 80 MG/DL (ref 65–140)
GLUCOSE UR STRIP-MCNC: NEGATIVE MG/DL
HCT VFR BLD AUTO: 36.9 % (ref 36.5–49.3)
HGB BLD-MCNC: 12.6 G/DL (ref 12–17)
HGB UR QL STRIP.AUTO: NEGATIVE
IMM GRANULOCYTES # BLD AUTO: 0.06 THOUSAND/UL (ref 0–0.2)
IMM GRANULOCYTES NFR BLD AUTO: 1 % (ref 0–2)
KETONES UR STRIP-MCNC: NEGATIVE MG/DL
LEUKOCYTE ESTERASE UR QL STRIP: NEGATIVE
LYMPHOCYTES # BLD AUTO: 2.11 THOUSANDS/ÂΜL (ref 0.6–4.47)
LYMPHOCYTES NFR BLD AUTO: 17 % (ref 14–44)
MCH RBC QN AUTO: 31.5 PG (ref 26.8–34.3)
MCHC RBC AUTO-ENTMCNC: 34.1 G/DL (ref 31.4–37.4)
MCV RBC AUTO: 92 FL (ref 82–98)
MONOCYTES # BLD AUTO: 0.88 THOUSAND/ÂΜL (ref 0.17–1.22)
MONOCYTES NFR BLD AUTO: 7 % (ref 4–12)
NEUTROPHILS # BLD AUTO: 8.92 THOUSANDS/ÂΜL (ref 1.85–7.62)
NEUTS SEG NFR BLD AUTO: 73 % (ref 43–75)
NITRITE UR QL STRIP: NEGATIVE
NON-SQ EPI CELLS URNS QL MICRO: NORMAL /HPF
NRBC BLD AUTO-RTO: 0 /100 WBCS
P AXIS: 61 DEGREES
PH UR STRIP.AUTO: 5.5 [PH]
PLATELET # BLD AUTO: 333 THOUSANDS/UL (ref 149–390)
PMV BLD AUTO: 8.9 FL (ref 8.9–12.7)
POTASSIUM SERPL-SCNC: 3.3 MMOL/L (ref 3.5–5.3)
PR INTERVAL: 148 MS
PROT SERPL-MCNC: 6.6 G/DL (ref 6.4–8.4)
PROT UR STRIP-MCNC: NEGATIVE MG/DL
QRS AXIS: -27 DEGREES
QRSD INTERVAL: 92 MS
QT INTERVAL: 410 MS
QTC INTERVAL: 482 MS
RBC # BLD AUTO: 4 MILLION/UL (ref 3.88–5.62)
RBC #/AREA URNS AUTO: NORMAL /HPF
SODIUM SERPL-SCNC: 139 MMOL/L (ref 135–147)
SP GR UR STRIP.AUTO: 1 (ref 1–1.03)
T WAVE AXIS: 67 DEGREES
UROBILINOGEN UR STRIP-ACNC: <2 MG/DL
VENTRICULAR RATE: 83 BPM
WBC # BLD AUTO: 12.16 THOUSAND/UL (ref 4.31–10.16)
WBC #/AREA URNS AUTO: NORMAL /HPF

## 2025-05-14 PROCEDURE — 36415 COLL VENOUS BLD VENIPUNCTURE: CPT

## 2025-05-14 PROCEDURE — 85025 COMPLETE CBC W/AUTO DIFF WBC: CPT

## 2025-05-14 PROCEDURE — 99284 EMERGENCY DEPT VISIT MOD MDM: CPT | Performed by: EMERGENCY MEDICINE

## 2025-05-14 PROCEDURE — 81001 URINALYSIS AUTO W/SCOPE: CPT

## 2025-05-14 PROCEDURE — 80053 COMPREHEN METABOLIC PANEL: CPT

## 2025-05-14 PROCEDURE — 93010 ELECTROCARDIOGRAM REPORT: CPT | Performed by: INTERNAL MEDICINE

## 2025-05-14 PROCEDURE — 99283 EMERGENCY DEPT VISIT LOW MDM: CPT

## 2025-05-14 PROCEDURE — 82075 ASSAY OF BREATH ETHANOL: CPT

## 2025-05-14 PROCEDURE — 93005 ELECTROCARDIOGRAM TRACING: CPT

## 2025-05-14 RX ORDER — POTASSIUM CHLORIDE 1500 MG/1
40 TABLET, EXTENDED RELEASE ORAL ONCE
Status: COMPLETED | OUTPATIENT
Start: 2025-05-14 | End: 2025-05-14

## 2025-05-14 RX ADMIN — POTASSIUM CHLORIDE 40 MEQ: 1500 TABLET, EXTENDED RELEASE ORAL at 22:05

## 2025-05-15 VITALS
OXYGEN SATURATION: 97 % | HEART RATE: 76 BPM | TEMPERATURE: 97.5 F | SYSTOLIC BLOOD PRESSURE: 162 MMHG | DIASTOLIC BLOOD PRESSURE: 87 MMHG | RESPIRATION RATE: 18 BRPM

## 2025-05-15 NOTE — CERTIFIED RECOVERY SPECIALIST
Time spent: 15 minutes        Purpose: HOST call incoming to patient about treatment     CRS assisted patient with ED phone to connect with HOST(Bre) [facility] . Patient requesting to use his own UBER/LYFT to be able to stop for cigarettes on way. OK per HOST/staff to allow patient to get his own transport.      Plan: Patient going to [facility] via UBER/LYFT ETA 10:30/11 AM today.

## 2025-05-15 NOTE — DISCHARGE INSTRUCTIONS
You were seen today for substance abuse requesting rehab. HOST is currently looking into a bed for you. They will call you back with placement updates. Please present yourself to the appropriate rehab facility when your spot has been secured. At this time, you are stable for discharge from the ER.

## 2025-05-15 NOTE — DISCHARGE INSTR - OTHER ORDERS
Danika Mcgowan  Certified     [facility]  [place]  [phone]  M-F 8am-4:30pm    [facility]  [address]  [place]  [address]  [phone]    [facility]  [address]  [place] [address]  [phone]    [place]   [address]  [place] [address]  [phone]    [facility] [place]   [address]  [place] [address]  [phone]    [facility] [place]   [address]  [place] [address]   [phone]    [facility]   [address] [place] [address]  [phone]    [facility]  [address]  [address]  [place] [address]  [phone]    [facility]    [employer] [facility] [employer] organizes meaningful events for people in recovery and their families. Our main goal is to have fun by connecting with nature and other people. We can then realize that there is a world of possibilities open to us, now that we are no longer in the bondage of addiction. These events are designed to provide :  Hope for those in early recovery  Tangible proof that life gets better when we’re sober  Service opportunities for people with recovery experience  Social connectedness for everyone involved    [address]  [place] [address]  Phone: [phone]  Email: info@[email or web address]   Website: [email or web address]/          [facility]   [address]    [place] [address]  [phone]

## 2025-05-15 NOTE — ED CARE HANDOFF
[facility] Warm Handoff Outcome Note    Patient name Milan Lal  Location ED 19/ED 19 MRN [id number]  Age: 60 y.o.          Plan Type:  Warm Handoff                                                                                    Plan Date: 5/14/2025  Service:  ED Warm Handoff      Substance Use History:  ETOH    Warm Handoff Update:  Pt accepted at [facility] but unable to admit till tomorrow morning    Warm Handoff Outcome: Residential  Inpatient

## 2025-05-15 NOTE — ED ATTENDING ATTESTATION
Final Diagnoses:   No diagnosis found.       I, Adams Sims MD, saw and evaluated the patient. All available labs and X-rays were ordered by me or the resident / non-physician and have been reviewed by myself. I discussed the patient with the resident / non-physician and agree with the resident's / non-physician practitioner's findings and plan as documented in the resident's / non-physician practicitioner's note, except where noted.   At this point, I agree with the current assessment done in the ED.   I was present during key portions of all procedures performed unless otherwise stated.     Nursing Triage:     Chief Complaint   Patient presents with    Detox Evaluation     Patient states was told to come to ER for a detox evaluation/referral. Recently relapsed with alcohol. States drank two beers about an hour ago. Also states having intermittent chest palpitations throughout the day until having a beer.        HPI:   As above     ASSESSMENT + PLAN:   As per ED course     Physical:     Vitals:    05/14/25 1930 05/14/25 1955   BP: 166/74 162/87   BP Location: Left arm    Pulse: 81 82   Resp: 18    Temp: 97.5 °F (36.4 °C)    TempSrc: Temporal    SpO2: 97%        - There are no obvious limitations to social determinants of care.   - Nursing note reviewed.   - Vitals reviewed.   - Orders placed by myself and/or advanced practitioner / resident.    - Previous chart was reviewed  - No language barrier.   - History obtained from patient.    - There are no limitations to the history obtained:     Past Medical:    has a past medical history of Anxiety, Asthma, Basal cell carcinoma (04/26/2023), Hypertension, Open fracture of nasal bones (08/21/2018), PONV (postoperative nausea and vomiting), Seizures (HCC), and Zygomatic fracture, left side, initial encounter for closed fracture (HCC) (08/21/2018).    Past Surgical:    has a past surgical history that includes Back surgery; Knee surgery (Left); Ankle surgery (Left);  Hip surgery (Left); Facial cosmetic surgery; pr open tx comp fx malar w/internal fx&mult surg (Left, 08/23/2018); pr closed tx nasal bone fx w/mnpj w/o stabilization (N/A, 08/23/2018); and Mohs surgery (Right, 08/07/2023).    Social:     Social History     Substance and Sexual Activity   Alcohol Use Yes    Alcohol/week: 21.0 standard drinks of alcohol    Types: 21 Cans of beer per week     Tobacco Use History[1]  Social History     Substance and Sexual Activity   Drug Use Not Currently    Types: Marijuana       Echo:     No results found for this or any previous visit.    No results found for this or any previous visit.      Cath:    No results found for this or any previous visit.      Code Status: Prior  Advance Directive and Living Will:      Power of :    POLST:    Medications - No data to display  No orders to display     Orders Placed This Encounter   Procedures    Rapid drug screen, urine    Urinalysis with microscopic    Consult to ED Warm handoff referral    POCT alcohol breath test    ECG 12 lead     Labs Reviewed   POCT ALCOHOL BREATH TEST - Normal       Result Value Ref Range Status    EXTBreath Alcohol 0.071   Final   RAPID DRUG SCREEN, URINE   URINALYSIS WITH MICROSCOPIC     ED Disposition       None          Follow-up Information    None       Patient's Medications   Discharge Prescriptions    No medications on file     No discharge procedures on file.  Prior to Admission Medications   Prescriptions Last Dose Informant Patient Reported? Taking?   albuterol (PROVENTIL HFA,VENTOLIN HFA) 90 mcg/act inhaler   No No   Sig: INHALE 2 PUFFS EVERY 6 (SIX) HOURS AS NEEDED FOR WHEEZING   benzonatate (TESSALON PERLES) 100 mg capsule  Self No No   Sig: Take 1 capsule (100 mg total) by mouth 3 (three) times a day as needed for cough   cloNIDine (CATAPRES) 0.1 mg tablet   Yes No   Sig: TAKE ONE TABLET UP TO TWO TIMES A DAY AS NEEDED FOR SEVERE ANXIETY   cyclobenzaprine (FLEXERIL) 5 mg tablet   No No   Sig:  "TAKE 1 TABLET (5 MG TOTAL) BY MOUTH 3 (THREE) TIMES A DAY AS NEEDED FOR MUSCLE SPASMS FOR UP TO 30 DOSES   dexlansoprazole (DEXILANT) 60 MG capsule   No No   Sig: Take 1 capsule (60 mg total) by mouth daily in the early morning   dicyclomine (BENTYL) 20 mg tablet   No No   Sig: Take 1 tablet (20 mg total) by mouth every 6 (six) hours for 14 days as needed for abdominal cramping   escitalopram (LEXAPRO) 20 mg tablet   No No   Sig: Take 1 tablet (20 mg total) by mouth daily for 14 days   folic acid (FOLVITE) 1 mg tablet   No No   Sig: Take 1 tablet (1 mg total) by mouth daily   omeprazole (PriLOSEC) 20 mg delayed release capsule   No No   Sig: Take 1 capsule (20 mg total) by mouth daily for 14 days   ondansetron (ZOFRAN-ODT) 4 mg disintegrating tablet   No No   Sig: Take 1 tablet (4 mg total) by mouth every 6 (six) hours as needed for nausea or vomiting   rizatriptan (MAXALT) 10 mg tablet   No No   Sig: TAKE 1 TABLET (10 MG TOTAL) BY MOUTH AS NEEDED FOR MIGRAINE TAKE AT THE ONSET OF MIGRAINE; IF SYMPTOMS CONTINUE OR RETURN, MAY TAKE ANOTHER DOSE AT LEAST 2 HOURS AFTER FIRST DOSE. TAKE NO MORE THAN 2 DOSES IN A DAY.   sildenafil (REVATIO) 20 mg tablet   No No   Sig: TAKE ONE TABLET BY MOUTH AS NEEDED FOR SEXUAL INTERCOURSE   thiamine 100 MG tablet   No No   Sig: Take 1 tablet (100 mg total) by mouth daily   topiramate (TOPAMAX) 25 mg tablet   No No   Si tab PO QHS for 1 week, increase as tolerated to 1 tab BID for 1 week, then 1 tab QAM and 2 tabs QHS for 1 week and finish at 2 tabs BID.      Facility-Administered Medications: None                        Portions of the record may have been created with voice recognition software. Occasional wrong word or \"sound a like\" substitutions may have occurred due to the inherent limitations of voice recognition software. Read the chart carefully and recognize, using context, where substitutions have occurred.    Electronically signed by:  Adams Sims       [1] "   Social History  Tobacco Use   Smoking Status Every Day    Current packs/day: 0.25    Types: Cigarettes   Smokeless Tobacco Current
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
## 2025-05-15 NOTE — ED NOTES
Pt reports obtaining his own transportation to [facility]. HOST aware and in agreement.      Mei Carmona RN  05/15/25 0905

## 2025-05-15 NOTE — CERTIFIED RECOVERY SPECIALIST
Time spent: 30 minutes    Consult Rec'd: YES  Patient seen in: ED  Stage of change: Action    Substance used: Beer/ Vodka/ marijuana thc  Frequency/Quantity: Daily / 24pk beer/12 shooters vodka  Date of last use: 5/14/25      Purpose: To connect with patient and offer support/resources    CRS met with patient and offered support. Patient was here yesterday for help getting to treatment, and was grateful he was able to stay overnight because he wants inpatient treatment and didn't trust himself. CRS and patient discussed his hx with substances and alcohol.     HISTORY: Treatment for IGOR/AUD in past includes [facility], [facility], [facility] (most recently- patient left AMA due to staff changes. Shortly after had recurrence of use. Prior to 2001 had a long period of sobriety from substances.        Plan: Patient leaving for [facility] via [facility] awaiting call from HOST for  time. CRS provided resources and contact information. Nurse and provider aware.

## 2025-07-12 ENCOUNTER — APPOINTMENT (OUTPATIENT)
Dept: RADIOLOGY | Age: 60
End: 2025-07-12
Payer: MEDICARE

## 2025-07-12 ENCOUNTER — OFFICE VISIT (OUTPATIENT)
Dept: URGENT CARE | Age: 60
End: 2025-07-12
Payer: MEDICARE

## 2025-07-12 VITALS
HEART RATE: 84 BPM | DIASTOLIC BLOOD PRESSURE: 80 MMHG | OXYGEN SATURATION: 100 % | RESPIRATION RATE: 18 BRPM | TEMPERATURE: 98.3 F | BODY MASS INDEX: 32.82 KG/M2 | SYSTOLIC BLOOD PRESSURE: 142 MMHG | WEIGHT: 242 LBS

## 2025-07-12 DIAGNOSIS — R07.81 RIB PAIN: ICD-10-CM

## 2025-07-12 DIAGNOSIS — S89.91XA INJURY OF RIGHT LOWER EXTREMITY, INITIAL ENCOUNTER: ICD-10-CM

## 2025-07-12 DIAGNOSIS — S80.811A ABRASION OF RIGHT LOWER EXTREMITY, INITIAL ENCOUNTER: ICD-10-CM

## 2025-07-12 DIAGNOSIS — L08.9 ABRASION, LEG W/ INFECTION, RIGHT, INITIAL ENCOUNTER: ICD-10-CM

## 2025-07-12 DIAGNOSIS — S09.90XA INJURY OF HEAD, INITIAL ENCOUNTER: Primary | ICD-10-CM

## 2025-07-12 DIAGNOSIS — Z23 ENCOUNTER FOR IMMUNIZATION: ICD-10-CM

## 2025-07-12 DIAGNOSIS — S80.811A ABRASION, LEG W/ INFECTION, RIGHT, INITIAL ENCOUNTER: ICD-10-CM

## 2025-07-12 PROCEDURE — 71101 X-RAY EXAM UNILAT RIBS/CHEST: CPT

## 2025-07-12 PROCEDURE — 73590 X-RAY EXAM OF LOWER LEG: CPT

## 2025-07-12 PROCEDURE — 99213 OFFICE O/P EST LOW 20 MIN: CPT

## 2025-07-12 PROCEDURE — 90715 TDAP VACCINE 7 YRS/> IM: CPT

## 2025-07-12 RX ORDER — CEPHALEXIN 500 MG/1
500 CAPSULE ORAL EVERY 8 HOURS SCHEDULED
Qty: 21 CAPSULE | Refills: 0 | Status: SHIPPED | OUTPATIENT
Start: 2025-07-12 | End: 2025-07-19
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
## 2025-07-12 NOTE — PATIENT INSTRUCTIONS
X-rays reviewed, no acute abnormality noted, awaiting official read.   Please begin antibiotics as directed for abrasion of right lower leg, tdap updated in office.   At this point, strongly recommend further evaluation in the emergency department for head strike with subsequent headache/dizziness. Patient states that he agrees to plan, will go to [facility] ED, he took Uber to clinic and will take Uber to ED.

## 2025-07-18 ENCOUNTER — TELEPHONE (OUTPATIENT)
Dept: NEUROLOGY | Facility: CLINIC | Age: 60
End: 2025-07-18

## 2025-07-18 NOTE — TELEPHONE ENCOUNTER
Called pt to confirm 7/23 appt but they wished to r/s to a later time. They were agreeable to 8/5 at 10am. Pt stated that they stopped taking Topamax due to side effects and would like to discuss new medication options at next visit.

## 2025-08-01 ENCOUNTER — TELEPHONE (OUTPATIENT)
Dept: NEUROLOGY | Facility: CLINIC | Age: 60
End: 2025-08-01

## (undated) DEVICE — PACK PBDS PLASTIC HEAD AND NECK RF

## (undated) DEVICE — DRAPE SURGIKIT SADDLE BAG

## (undated) DEVICE — OCULAR CONFORMER - NON VENTED - MEDIUM: Brand: MEDPOR

## (undated) DEVICE — 3M™ STERI-STRIP™ REINFORCED ADHESIVE SKIN CLOSURES, R1547, 1/2 IN X 4 IN (12 MM X 100 MM), 6 STRIPS/ENVELOPE: Brand: 3M™ STERI-STRIP™

## (undated) DEVICE — SYRINGE 10ML LL

## (undated) DEVICE — SUT SILK 3-0 RB-1 30 IN K872H

## (undated) DEVICE — TI MATRIXMIDFACE EMERGENCY SCREW 6MM
Type: IMPLANTABLE DEVICE | Site: FACE | Status: NON-FUNCTIONAL
Brand: MATRIXMIDFACE
Removed: 2018-08-23

## (undated) DEVICE — INTENDED FOR TISSUE SEPARATION, AND OTHER PROCEDURES THAT REQUIRE A SHARP SURGICAL BLADE TO PUNCTURE OR CUT.: Brand: BARD-PARKER ® CARBON RIB-BACK BLADES

## (undated) DEVICE — GLOVE SRG BIOGEL ORTHOPEDIC 7.5

## (undated) DEVICE — 3M™ STERI-STRIP™ REINFORCED ADHESIVE SKIN CLOSURES, R1542, 1/4 IN X 1-1/2 IN (6 MM X 38 MM), 6 STRIPS/ENVELOPE: Brand: 3M™ STERI-STRIP™

## (undated) DEVICE — SUT PLAIN 6-0 PC-1 18 IN 1916G

## (undated) DEVICE — SYRINGE BULB 2 OZ

## (undated) DEVICE — REM POLYHESIVE ADULT PATIENT RETURN ELECTRODE: Brand: VALLEYLAB

## (undated) DEVICE — CONMED ACCESSORY ELECTRODE, NEEDLE WITH EXTENDED INSULATION: Brand: CONMED

## (undated) DEVICE — 3000CC GUARDIAN II: Brand: GUARDIAN